# Patient Record
Sex: FEMALE | Race: BLACK OR AFRICAN AMERICAN | Employment: PART TIME | URBAN - METROPOLITAN AREA
[De-identification: names, ages, dates, MRNs, and addresses within clinical notes are randomized per-mention and may not be internally consistent; named-entity substitution may affect disease eponyms.]

---

## 2017-07-09 ENCOUNTER — HOSPITAL ENCOUNTER (EMERGENCY)
Facility: HOSPITAL | Age: 22
Discharge: HOME | End: 2017-07-09
Attending: EMERGENCY MEDICINE
Payer: COMMERCIAL

## 2017-07-09 VITALS
HEIGHT: 63 IN | SYSTOLIC BLOOD PRESSURE: 125 MMHG | TEMPERATURE: 98 F | OXYGEN SATURATION: 98 % | BODY MASS INDEX: 51.91 KG/M2 | HEART RATE: 99 BPM | DIASTOLIC BLOOD PRESSURE: 90 MMHG | RESPIRATION RATE: 14 BRPM | WEIGHT: 293 LBS

## 2017-07-09 DIAGNOSIS — H66.002 ACUTE SUPPURATIVE OTITIS MEDIA OF LEFT EAR WITHOUT SPONTANEOUS RUPTURE OF TYMPANIC MEMBRANE, RECURRENCE NOT SPECIFIED: Primary | ICD-10-CM

## 2017-07-09 PROCEDURE — 99283 EMERGENCY DEPT VISIT LOW MDM: CPT

## 2017-07-09 RX ORDER — AMOXICILLIN 500 MG/1
500 TABLET, FILM COATED ORAL 3 TIMES DAILY
Qty: 30 TABLET | Refills: 0 | Status: SHIPPED | OUTPATIENT
Start: 2017-07-09 | End: 2017-07-19

## 2017-07-09 NOTE — ED INITIAL ASSESSMENT (HPI)
Pt here with decreased hearing x 1wk. Last night could not sleep due to ticking in her ear. Left ear slightly painful. Went swimming yesterday.

## 2017-07-09 NOTE — ED PROVIDER NOTES
Patient Seen in: [facility] Emergency Department    History   Patient presents with:  Ear Problem Pain (neurosensory)    Stated Complaint: ear pain/sore throat    ALESSANDRO    Yesy Sharp is 64-year-old female coming with complaints of ear pain.   She started no home      Disposition and Plan     Clinical Impression:  Acute suppurative otitis media of left ear without spontaneous rupture of tympanic membrane, recurrence not specified  (primary encounter diagnosis)    Disposition:  Discharge    Follow-up:  No heleno

## 2019-07-07 ENCOUNTER — HOSPITAL ENCOUNTER (EMERGENCY)
Facility: HOSPITAL | Age: 24
Discharge: HOME | End: 2019-07-07
Attending: PEDIATRICS
Payer: COMMERCIAL

## 2019-07-07 VITALS
DIASTOLIC BLOOD PRESSURE: 85 MMHG | OXYGEN SATURATION: 99 % | HEIGHT: 63 IN | WEIGHT: 293 LBS | RESPIRATION RATE: 18 BRPM | SYSTOLIC BLOOD PRESSURE: 147 MMHG | HEART RATE: 91 BPM | TEMPERATURE: 97 F | BODY MASS INDEX: 51.91 KG/M2

## 2019-07-07 DIAGNOSIS — H91.92 HEARING DIFFICULTY OF LEFT EAR: Primary | ICD-10-CM

## 2019-07-07 PROCEDURE — 99282 EMERGENCY DEPT VISIT SF MDM: CPT

## 2019-07-07 NOTE — ED INITIAL ASSESSMENT (HPI)
C/o hearing change in L ear, reports she can hear but feels the pitch changes with volume increase. Denies pain or discharge. Has just recently returned from [facility] where she was at the [facility].

## 2019-07-07 NOTE — ED PROVIDER NOTES
Patient Seen in: [facility] Emergency Department    History   Patient presents with:  Ear Problem Pain (neurosensory)    Stated Complaint: hearing echos in L ear    HPI    Patient is a 78-year-old female complaining of a feeling of echoes in her lef perfused. Dermatologic exam: No rashes or lesions. Neurologic exam: Cranial nerves 2-12 grossly intact. Orthopedic exam: normal,from.     ED Course   Labs Reviewed - No data to display       Patient appears to have some inner ear fluid that does not ap

## 2019-07-22 ENCOUNTER — HOSPITAL ENCOUNTER (EMERGENCY)
Facility: HOSPITAL | Age: 24
Discharge: HOME | End: 2019-07-22
Attending: EMERGENCY MEDICINE
Payer: COMMERCIAL

## 2019-07-22 VITALS
HEIGHT: 63 IN | SYSTOLIC BLOOD PRESSURE: 146 MMHG | HEART RATE: 84 BPM | RESPIRATION RATE: 16 BRPM | DIASTOLIC BLOOD PRESSURE: 79 MMHG | WEIGHT: 293 LBS | OXYGEN SATURATION: 98 % | TEMPERATURE: 99 F | BODY MASS INDEX: 51.91 KG/M2

## 2019-07-22 DIAGNOSIS — T74.21XA SEXUAL ASSAULT OF ADULT, INITIAL ENCOUNTER: Primary | ICD-10-CM

## 2019-07-22 PROCEDURE — 87491 CHLMYD TRACH DNA AMP PROBE: CPT | Performed by: EMERGENCY MEDICINE

## 2019-07-22 PROCEDURE — 99285 EMERGENCY DEPT VISIT HI MDM: CPT

## 2019-07-22 PROCEDURE — 87591 N.GONORRHOEAE DNA AMP PROB: CPT | Performed by: EMERGENCY MEDICINE

## 2019-07-22 PROCEDURE — 99283 EMERGENCY DEPT VISIT LOW MDM: CPT

## 2019-07-22 PROCEDURE — 96372 THER/PROPH/DIAG INJ SC/IM: CPT

## 2019-07-22 RX ORDER — LEVONORGESTREL 1.5 MG/1
1.5 TABLET ORAL ONCE
Status: COMPLETED | OUTPATIENT
Start: 2019-07-22 | End: 2019-07-22

## 2019-07-22 RX ORDER — AZITHROMYCIN 250 MG/1
1000 TABLET, FILM COATED ORAL ONCE
Status: COMPLETED | OUTPATIENT
Start: 2019-07-22 | End: 2019-07-22

## 2019-07-22 NOTE — ED PROVIDER NOTES
Patient Seen in: [facility] Emergency Department    History   Patient presents with:  Eval-S (psychosocial)    Stated Complaint: SA    HPI    The patient is a 50-year-old female who presents emergency room with a history of present in the ER for exa appreciated anywhere throughout the abdomen. There is no guarding, no rebound, no mass, and no organomegaly appreciated. There is normoactive bowel sounds. There is no hernia. EXTREMITIES: There is no cyanosis, clubbing, or edema appreciated.  Pulses are 2

## 2019-07-23 LAB
C TRACH DNA SPEC QL NAA+PROBE: POSITIVE
N GONORRHOEA DNA SPEC QL NAA+PROBE: NEGATIVE

## 2019-07-23 NOTE — ED NOTES
Jackie Fried 22year old female [address] [place] [address] Drive #: [id number]  Chief Complaint: Patient presents with:  Eval-S (psychosocial)    Date of exam: 7/22/2019 Time of Exam:1710  ED Staff MD: Renato Jaramillo MD  ED RN: Raegan Escalante

## 2020-11-19 ENCOUNTER — ORDER TRANSCRIPTION (OUTPATIENT)
Dept: SLEEP CENTER | Age: 25
End: 2020-11-19

## 2020-11-19 DIAGNOSIS — G47.30 SLEEP APNEA: Primary | ICD-10-CM

## 2020-12-15 PROBLEM — Z51.81 MEDICATION MONITORING ENCOUNTER: Status: ACTIVE | Noted: 2020-12-15

## 2021-01-13 ENCOUNTER — APPOINTMENT (OUTPATIENT)
Dept: GENERAL RADIOLOGY | Facility: HOSPITAL | Age: 26
End: 2021-01-13
Attending: EMERGENCY MEDICINE
Payer: MEDICAID

## 2021-01-13 ENCOUNTER — HOSPITAL ENCOUNTER (EMERGENCY)
Facility: HOSPITAL | Age: 26
Discharge: HOME | End: 2021-01-13
Attending: EMERGENCY MEDICINE
Payer: MEDICAID

## 2021-01-13 VITALS
TEMPERATURE: 99 F | DIASTOLIC BLOOD PRESSURE: 77 MMHG | HEIGHT: 63 IN | OXYGEN SATURATION: 100 % | BODY MASS INDEX: 51.91 KG/M2 | RESPIRATION RATE: 16 BRPM | HEART RATE: 102 BPM | WEIGHT: 293 LBS | SYSTOLIC BLOOD PRESSURE: 147 MMHG

## 2021-01-13 DIAGNOSIS — S92.902A CLOSED FRACTURE OF LEFT FOOT, INITIAL ENCOUNTER: Primary | ICD-10-CM

## 2021-01-13 PROCEDURE — 73610 X-RAY EXAM OF ANKLE: CPT | Performed by: EMERGENCY MEDICINE

## 2021-01-13 PROCEDURE — 73630 X-RAY EXAM OF FOOT: CPT | Performed by: EMERGENCY MEDICINE

## 2021-01-13 PROCEDURE — 99284 EMERGENCY DEPT VISIT MOD MDM: CPT

## 2021-01-13 PROCEDURE — 73590 X-RAY EXAM OF LOWER LEG: CPT | Performed by: EMERGENCY MEDICINE

## 2021-01-13 PROCEDURE — 29515 APPLICATION SHORT LEG SPLINT: CPT

## 2021-01-13 PROCEDURE — 99283 EMERGENCY DEPT VISIT LOW MDM: CPT

## 2021-01-13 NOTE — ED PROVIDER NOTES
Patient Seen in: [facility] Emergency Department      History   Patient presents with:  Leg or Foot Injury    Stated Complaint: Twisted ankle while walking, slipped on ice.      HPI/Subjective:   HPI    67-year-old female presents emergency room for Scalp is atraumatic. NECK: No midline cervical thoracic or lumbar spine tenderness step-off   HEART: Regular rate and rhythm, no murmurs. LUNGS: Clear to auscultation bilaterally. No Rales, no rhonchi, no wheezing, no stridor.   EXTREMITIES: No tendernes

## 2021-01-13 NOTE — ED NOTES
Report received from [facility], RN called to XR to inform of new imaging added on by MD. Pt ready for XR now

## 2021-07-20 ENCOUNTER — HOSPITAL ENCOUNTER (EMERGENCY)
Facility: HOSPITAL | Age: 26
Discharge: HOME | End: 2021-07-20
Attending: EMERGENCY MEDICINE
Payer: OTHER MISCELLANEOUS

## 2021-07-20 VITALS
TEMPERATURE: 98 F | OXYGEN SATURATION: 98 % | HEIGHT: 63 IN | WEIGHT: 293 LBS | HEART RATE: 88 BPM | RESPIRATION RATE: 18 BRPM | BODY MASS INDEX: 51.91 KG/M2 | SYSTOLIC BLOOD PRESSURE: 144 MMHG | DIASTOLIC BLOOD PRESSURE: 78 MMHG

## 2021-07-20 DIAGNOSIS — T26.92XA CHEMICAL BURN OF LEFT EYE: Primary | ICD-10-CM

## 2021-07-20 PROCEDURE — 99283 EMERGENCY DEPT VISIT LOW MDM: CPT

## 2021-07-20 RX ORDER — TETRACAINE HYDROCHLORIDE 5 MG/ML
2 SOLUTION OPHTHALMIC ONCE
Status: COMPLETED | OUTPATIENT
Start: 2021-07-20 | End: 2021-07-20

## 2021-07-20 RX ORDER — ERYTHROMYCIN 5 MG/G
1 OINTMENT OPHTHALMIC EVERY 6 HOURS
Qty: 1 G | Refills: 0 | Status: SHIPPED | OUTPATIENT
Start: 2021-07-20 | End: 2021-07-27

## 2021-07-20 NOTE — ED PROVIDER NOTES
Patient Seen in: [facility] Emergency Department      History   Patient presents with:   Eye Visual Problem    Stated Complaint: grease to L eye from work    HPI/Subjective:   HPI    This is a very pleasant 21-year-old female that presents for evalua 20/40 right eye  20/25 left eye. ED Course   Labs Reviewed - No data to display                MDM          Patient can follow-up with ophthalmology. Tetanus immunization up-to-date. Will prescribe erythromycin ointment.   Discussed wearing eye p

## 2021-07-20 NOTE — ED INITIAL ASSESSMENT (HPI)
Pt was working near [employer] at [facility] and the [facility], and went in her eye. Flushed eye for 5 min.

## 2021-12-19 ENCOUNTER — LAB SERVICES (OUTPATIENT)
Dept: URGENT CARE | Age: 26
End: 2021-12-19

## 2021-12-19 ENCOUNTER — TELEPHONE (OUTPATIENT)
Dept: SCHEDULING | Age: 26
End: 2021-12-19

## 2021-12-19 DIAGNOSIS — Z01.812 ENCOUNTER FOR SCREENING LABORATORY TESTING FOR COVID-19 VIRUS IN ASYMPTOMATIC PATIENT: Primary | ICD-10-CM

## 2021-12-19 DIAGNOSIS — Z11.52 ENCOUNTER FOR SCREENING LABORATORY TESTING FOR COVID-19 VIRUS IN ASYMPTOMATIC PATIENT: Primary | ICD-10-CM

## 2021-12-19 PROCEDURE — U0003 INFECTIOUS AGENT DETECTION BY NUCLEIC ACID (DNA OR RNA); SEVERE ACUTE RESPIRATORY SYNDROME CORONAVIRUS 2 (SARS-COV-2) (CORONAVIRUS DISEASE [COVID-19]), AMPLIFIED PROBE TECHNIQUE, MAKING USE OF HIGH THROUGHPUT TECHNOLOGIES AS DESCRIBED BY CMS-2020-01-R: HCPCS | Performed by: PSYCHIATRY & NEUROLOGY

## 2021-12-19 PROCEDURE — U0005 INFEC AGEN DETEC AMPLI PROBE: HCPCS | Performed by: PSYCHIATRY & NEUROLOGY

## 2021-12-20 LAB
SARS-COV-2 RNA RESP QL NAA+PROBE: NOT DETECTED
SERVICE CMNT-IMP: NORMAL
SERVICE CMNT-IMP: NORMAL

## 2022-04-11 ENCOUNTER — ORDER TRANSCRIPTION (OUTPATIENT)
Dept: SLEEP CENTER | Age: 27
End: 2022-04-11

## 2022-04-25 ENCOUNTER — LAB ENCOUNTER (OUTPATIENT)
Dept: LAB | Facility: HOSPITAL | Age: 27
End: 2022-04-25
Attending: FAMILY MEDICINE
Payer: MEDICAID

## 2022-04-25 DIAGNOSIS — Z01.818 PREOP EXAMINATION: ICD-10-CM

## 2022-04-25 DIAGNOSIS — Z11.59 SCREENING FOR VIRAL DISEASE: ICD-10-CM

## 2022-04-25 LAB — SARS-COV-2 RNA RESP QL NAA+PROBE: NOT DETECTED

## 2022-04-28 ENCOUNTER — OFFICE VISIT (OUTPATIENT)
Dept: SLEEP CENTER | Age: 27
End: 2022-04-28
Attending: INTERNAL MEDICINE
Payer: MEDICAID

## 2022-04-28 DIAGNOSIS — G47.30 SLEEP APNEA, UNSPECIFIED TYPE: ICD-10-CM

## 2022-04-28 PROCEDURE — 95811 POLYSOM 6/>YRS CPAP 4/> PARM: CPT

## 2022-04-28 PROCEDURE — 95810 POLYSOM 6/> YRS 4/> PARAM: CPT

## 2024-03-13 PROCEDURE — 99282 EMERGENCY DEPT VISIT SF MDM: CPT

## 2024-03-13 PROCEDURE — 99283 EMERGENCY DEPT VISIT LOW MDM: CPT

## 2024-03-14 ENCOUNTER — HOSPITAL ENCOUNTER (EMERGENCY)
Facility: HOSPITAL | Age: 29
Discharge: HOME | End: 2024-03-14
Attending: EMERGENCY MEDICINE

## 2024-03-14 VITALS
OXYGEN SATURATION: 98 % | WEIGHT: 293 LBS | HEART RATE: 85 BPM | TEMPERATURE: 97 F | DIASTOLIC BLOOD PRESSURE: 65 MMHG | BODY MASS INDEX: 65 KG/M2 | RESPIRATION RATE: 20 BRPM | SYSTOLIC BLOOD PRESSURE: 140 MMHG

## 2024-03-14 DIAGNOSIS — T21.22XA PARTIAL THICKNESS BURN OF ABDOMINAL WALL, INITIAL ENCOUNTER: Primary | ICD-10-CM

## 2024-03-14 NOTE — ED PROVIDER NOTES
Patient Seen in: [facility] Emergency Department      History     Chief Complaint   Patient presents with    Burn     Stated Complaint: burn from cooking to stomach    Subjective:   HPI    28-year-old female presenting emergency room for burn.  Patient is currently 3:00 in the morning 24 hours ago suffered a burn to her stomach she says she been doing dressing changes and using triple antibiotic ointment on it but she just wanted to get checked out so she now presents emergency department denies any other complaints.    Objective:   Past Medical History:   Diagnosis Date    Hypothyroid     Prediabetes               History reviewed. No pertinent surgical history.             Social History     Socioeconomic History    Marital status: Single   Occupational History    Occupation: student   Tobacco Use    Smoking status: Never    Smokeless tobacco: Never   Vaping Use    Vaping Use: Never used   Substance and Sexual Activity    Alcohol use: Yes    Drug use: Never              Review of Systems    Positive for stated complaint: burn from cooking to stomach  Other systems are as noted in HPI.  Constitutional and vital signs reviewed.      All other systems reviewed and negative except as noted above.    Physical Exam     ED Triage Vitals [03/14/24 0016]   /85   Pulse 86   Resp 20   Temp 96.9 °F (36.1 °C)   Temp src Temporal   SpO2 99 %   O2 Device None (Room air)       Current:/85   Pulse 86   Temp 96.9 °F (36.1 °C) (Temporal)   Resp 20   Wt (!) 165.6 kg   SpO2 99%   BMI 65.49 kg/m²         Physical Exam    Awake alert patient appears no distress HEENT exam normal lungs normal cardiovascular exam normal abdomen abdominal wall small partial-thickness burn left mid abdominal wall no surrounding erythema no blistering of skin no partials no full-thickness burn noted.  Extremity exam is normal no focal neurologic deficits    ED Course   Labs Reviewed - No data to display          Differential  diagnosis includes full-thickness burn partial-thickness burn         MDM                                         Medical Decision Making  28-year-old female presenting with a partial-thickness burn to the abdominal wall due to a thermal injury.  Patient exam does not appear to have any sort of cellulitic area is a small area of burn he was given burn instructions as well as wound care she was referred to the [place] [facility] clinic for follow-up she is to return emerged part worsening symptoms other complaints the patient was screened and evaluated during this visit.  As a treating physician attending to the patient, I determined, within reasonable clinical confidence and prior to discharge, that an emergency medical condition was not or was no longer present.  There was no indication for further evaluation, treatment or admission on an emergency basis.    The usual and customary discharge instructions were discussed given the patient's ER course.  We discussed signs and symptoms that should prompt the patient's immediate return to the emergency department.  Reasonable over-the-counter and prescription treatment options and physician follow-up plan was discussed.  Patient was discharged home in good condition    This note was prepared using Dragon Medical voice recognition dictation software.  As a result errors may occur.  When identified to these areas have been corrected.  While every attempt is made to correct errors during dictation discrepancies may still exist.  Please contact if there are any errors    Problems Addressed:  Partial thickness burn of abdominal wall, initial encounter: acute illness or injury    Amount and/or Complexity of Data Reviewed  ECG/medicine tests: ordered and independent interpretation performed. Decision-making details documented in ED Course.        Disposition and Plan     Clinical Impression:  1. Partial thickness burn of abdominal wall, initial encounter          Disposition:  Discharge  3/14/2024  3:33 am    Follow-up:  [place]  [address] [facility] [address]  [phone]    Follow up in 1 week(s)            Medications Prescribed:  Current Discharge Medication List

## 2024-10-01 ENCOUNTER — HOSPITAL ENCOUNTER (EMERGENCY)
Facility: HOSPITAL | Age: 29
Discharge: HOME | End: 2024-10-01
Attending: STUDENT IN AN ORGANIZED HEALTH CARE EDUCATION/TRAINING PROGRAM
Payer: MEDICAID

## 2024-10-01 VITALS
SYSTOLIC BLOOD PRESSURE: 120 MMHG | DIASTOLIC BLOOD PRESSURE: 73 MMHG | TEMPERATURE: 98 F | BODY MASS INDEX: 51.91 KG/M2 | OXYGEN SATURATION: 100 % | HEART RATE: 98 BPM | RESPIRATION RATE: 16 BRPM | WEIGHT: 293 LBS | HEIGHT: 63 IN

## 2024-10-01 DIAGNOSIS — J01.90 ACUTE SINUSITIS, RECURRENCE NOT SPECIFIED, UNSPECIFIED LOCATION: Primary | ICD-10-CM

## 2024-10-01 LAB
FLUAV + FLUBV RNA SPEC NAA+PROBE: NEGATIVE
FLUAV + FLUBV RNA SPEC NAA+PROBE: NEGATIVE
RSV RNA SPEC NAA+PROBE: NEGATIVE
SARS-COV-2 RNA RESP QL NAA+PROBE: NOT DETECTED

## 2024-10-01 PROCEDURE — 87430 STREP A AG IA: CPT | Performed by: STUDENT IN AN ORGANIZED HEALTH CARE EDUCATION/TRAINING PROGRAM

## 2024-10-01 PROCEDURE — 99283 EMERGENCY DEPT VISIT LOW MDM: CPT

## 2024-10-01 PROCEDURE — 0241U SARS-COV-2/FLU A AND B/RSV BY PCR (GENEXPERT): CPT | Performed by: STUDENT IN AN ORGANIZED HEALTH CARE EDUCATION/TRAINING PROGRAM

## 2024-10-01 RX ORDER — FLUTICASONE PROPIONATE 50 MCG
2 SPRAY, SUSPENSION (ML) NASAL DAILY
Qty: 16 G | Refills: 0 | Status: SHIPPED | OUTPATIENT
Start: 2024-10-01 | End: 2024-10-31

## 2024-10-01 RX ORDER — PSEUDOEPHEDRINE HCL 30 MG
30 TABLET ORAL EVERY 4 HOURS PRN
Qty: 12 TABLET | Refills: 0 | Status: SHIPPED | OUTPATIENT
Start: 2024-10-01 | End: 2024-10-04

## 2024-10-01 NOTE — ED PROVIDER NOTES
History     Chief Complaint   Patient presents with    Sore Throat    Ear Problem Pain     Started after coming back from [place] on 9/24     Vomiting       HPI    29 year old female here with 4-5d of congestion, cough, sore throat, fatigue, myalgias, at onset patient had vomiting and diarrhea which has started to improve.  She feels pressure along bilateral ears.  She had leftover amoxicillin tablets from an old infection and she took yesterday and today.          Past Medical History:    Hypothyroid    Prediabetes       History reviewed. No pertinent surgical history.    Social History     Socioeconomic History    Marital status: Single   Occupational History    Occupation: student   Tobacco Use    Smoking status: Never    Smokeless tobacco: Never   Vaping Use    Vaping status: Never Used   Substance and Sexual Activity    Alcohol use: Yes    Drug use: Never                   Physical Exam     ED Triage Vitals [10/01/24 0955]   /72   Pulse 103   Resp 18   Temp 98 °F (36.7 °C)   Temp src Temporal   SpO2 98 %   O2 Device None (Room air)       Physical Exam  Constitutional:       General: She is not in acute distress.  HENT:      Ears:      Comments: Effusion along bilateral TM with no erythema or bulging     Nose: Congestion and rhinorrhea present.      Mouth/Throat:      Pharynx: Posterior oropharyngeal erythema present. No oropharyngeal exudate.      Comments: Uvula midline, no trismus or drooling, there is bilateral tonsillar swelling without exudates  Eyes:      Extraocular Movements: Extraocular movements intact.   Cardiovascular:      Rate and Rhythm: Normal rate and regular rhythm.      Pulses: Normal pulses.      Heart sounds: Normal heart sounds.   Pulmonary:      Effort: Pulmonary effort is normal. No respiratory distress.      Breath sounds: Normal breath sounds.   Abdominal:      General: Abdomen is flat. There is no distension.      Tenderness: There is no abdominal tenderness.    Musculoskeletal:      Cervical back: Normal range of motion.   Neurological:      General: No focal deficit present.      Mental Status: She is alert.              ED Course     Labs Reviewed   RAPID STREP A SCREEN (LC) - Normal    Narrative:     A confirmatory culture is recommended if clinically indicated.   SARS-COV-2/FLU A AND B/RSV BY PCR (GENEXPERT) - Normal    Narrative:     This test is intended for the qualitative detection and differentiation of SARS-CoV-2, influenza A, influenza B, and respiratory syncytial virus (RSV) viral RNA in nasopharyngeal or nares swabs from individuals suspected of respiratory viral infection consistent with COVID-19 by their healthcare provider. Signs and symptoms of respiratory viral infection due to SARS-CoV-2, influenza, and RSV can be similar.    Test performed using the Xpert Xpress SARS-CoV-2/FLU/RSV (real time RT-PCR)  assay on the [employer] instrument, [employer], [place] [address].   This test is being used under the Food and Drug Administration's Emergency Use Authorization.    The authorized Fact Sheet for Healthcare Providers for this assay is available upon request from the laboratory.     No results found.        MDM     Vitals:    10/01/24 0955 10/01/24 1144   BP: 136/72 120/73   Pulse: 103 98   Resp: 18 16   Temp: 98 °F (36.7 °C)    TempSrc: Temporal    SpO2: 98% 100%   Weight: (!) 167.8 kg    Height: 160 cm (5' 3\")        Patient very likely has viral upper respiratory infection with viral syndrome.  She does have development of appears to be sinusitis, clinical picture is moderate since she has taken antibiotics for the past 48 hours.  Will swab for strep although may be falsely negative.  Will check viruses.    ED Course as of 10/01/24 1605  ------------------------------------------------------------  Time: 10/01 1130  Comment: Swabs here are negative.  Will give patient a 5-day course of Augmentin to finish out her 7 days for sinusitis treatment.  In  addition [place] pot and Flonase, decongestant.  Discussed all findings.  Patient is feeling well to be discharged.  Vitals remain stable.  Ambulating without difficulty.  Given written and verbal instructions regarding this condition and strict return precautions.   Will follow up in clinic.   Patient verbalizes understanding of instructions and follow up plan, as well as indications to return to the emergency department.           Disposition and Plan     Clinical Impression:  1. Acute sinusitis, recurrence not specified, unspecified location        Disposition:  Discharge    Follow-up:  Magali Polo MD  [facility] [place] [address]  [phone]    Follow up        Medications Prescribed:  Discharge Medication List as of 10/1/2024 11:44 AM        START taking these medications    Details   amoxicillin clavulanate 875-125 MG Oral Tab Take 1 tablet by mouth 2 (two) times daily for 5 days., Normal, Disp-10 tablet, R-0      fluticasone propionate 50 MCG/ACT Nasal Suspension 2 sprays by Nasal route daily., Normal, Disp-16 g, R-0      pseudoephedrine 30 MG Oral Tab Take 1 tablet (30 mg total) by mouth every 4 (four) hours as needed for congestion., Normal, Disp-12 tablet, R-0

## 2024-10-01 NOTE — ED INITIAL ASSESSMENT (HPI)
Pt to ER ambulatory with various complaints, arrived from [place] 5 days ago emesis started Friday ([id number]), intermittent diarrhea, fever 100F and chills. Bilateral neck pain, worse to the left side. States \"tonsils are sticking together and I can't swallow\". Cough, congestion and sneezing for 3 days. For 2 days has had bilateral eye itching with mucus. Pt also adds that \"I cannot hear. There is pressure and it feels like there are cotton balls in my ears\"

## 2024-10-01 NOTE — ED QUICK NOTES
Rounding Completed    Plan of Care reviewed. Waiting for test results.  Elimination needs assessed.    Bed is locked and in lowest position. Call light within reach.

## 2025-02-08 ENCOUNTER — ANESTHESIA EVENT (OUTPATIENT)
Dept: OBGYN UNIT | Facility: HOSPITAL | Age: 30
End: 2025-02-08

## 2025-02-08 ENCOUNTER — HOSPITAL ENCOUNTER (INPATIENT)
Facility: HOSPITAL | Age: 30
LOS: 2 days | Discharge: HOME | End: 2025-02-10
Attending: OBSTETRICS & GYNECOLOGY | Admitting: OBSTETRICS & GYNECOLOGY
Payer: MEDICAID

## 2025-02-08 ENCOUNTER — ANESTHESIA (OUTPATIENT)
Dept: OBGYN UNIT | Facility: HOSPITAL | Age: 30
End: 2025-02-08

## 2025-02-08 ENCOUNTER — APPOINTMENT (OUTPATIENT)
Dept: ULTRASOUND IMAGING | Facility: HOSPITAL | Age: 30
End: 2025-02-08
Attending: EMERGENCY MEDICINE
Payer: MEDICAID

## 2025-02-08 ENCOUNTER — HOSPITAL ENCOUNTER (INPATIENT)
Facility: HOSPITAL | Age: 30
LOS: 1 days | Discharge: HOME | End: 2025-02-08
Attending: EMERGENCY MEDICINE | Admitting: OBSTETRICS & GYNECOLOGY
Payer: MEDICAID

## 2025-02-08 ENCOUNTER — APPOINTMENT (OUTPATIENT)
Dept: CT IMAGING | Facility: HOSPITAL | Age: 30
End: 2025-02-08
Attending: EMERGENCY MEDICINE
Payer: MEDICAID

## 2025-02-08 VITALS
BODY MASS INDEX: 51.91 KG/M2 | OXYGEN SATURATION: 100 % | HEART RATE: 94 BPM | WEIGHT: 293 LBS | HEIGHT: 63 IN | TEMPERATURE: 98 F | RESPIRATION RATE: 18 BRPM | DIASTOLIC BLOOD PRESSURE: 69 MMHG | SYSTOLIC BLOOD PRESSURE: 134 MMHG

## 2025-02-08 DIAGNOSIS — O60.00: Primary | ICD-10-CM

## 2025-02-08 PROBLEM — Z34.90 PREGNANCY (HCC): Status: ACTIVE | Noted: 2025-02-08

## 2025-02-08 PROBLEM — O09.33 NO PRENATAL CARE IN CURRENT PREGNANCY IN THIRD TRIMESTER (HCC): Status: ACTIVE | Noted: 2025-02-08

## 2025-02-08 LAB
ALBUMIN SERPL-MCNC: 3.6 G/DL (ref 3.2–4.8)
ALBUMIN/GLOB SERPL: 1 {RATIO} (ref 1–2)
ALP LIVER SERPL-CCNC: 205 U/L
ALT SERPL-CCNC: <7 U/L
ANION GAP SERPL CALC-SCNC: 11 MMOL/L (ref 0–18)
ANTIBODY SCREEN: NEGATIVE
AST SERPL-CCNC: 12 U/L (ref ?–34)
B-HCG SERPL-ACNC: 4376 MIU/ML
B-HCG UR QL: POSITIVE
BASOPHILS # BLD AUTO: 0.02 X10(3) UL (ref 0–0.2)
BASOPHILS NFR BLD AUTO: 0.2 %
BILIRUB SERPL-MCNC: 0.6 MG/DL (ref 0.3–1.2)
BUN BLD-MCNC: <5 MG/DL (ref 9–23)
CALCIUM BLD-MCNC: 8.8 MG/DL (ref 8.7–10.6)
CHLORIDE SERPL-SCNC: 106 MMOL/L (ref 98–112)
CO2 SERPL-SCNC: 19 MMOL/L (ref 21–32)
CREAT BLD-MCNC: 0.67 MG/DL
EGFRCR SERPLBLD CKD-EPI 2021: 122 ML/MIN/1.73M2 (ref 60–?)
EOSINOPHIL # BLD AUTO: 0.02 X10(3) UL (ref 0–0.7)
EOSINOPHIL NFR BLD AUTO: 0.2 %
ERYTHROCYTE [DISTWIDTH] IN BLOOD BY AUTOMATED COUNT: 14.8 %
EST. AVERAGE GLUCOSE BLD GHB EST-MCNC: 120 MG/DL (ref 68–126)
GLOBULIN PLAS-MCNC: 3.5 G/DL (ref 2–3.5)
GLUCOSE BLD-MCNC: 114 MG/DL (ref 70–99)
HBA1C MFR BLD: 5.8 % (ref ?–5.7)
HBV SURFACE AG SER-ACNC: 0.1 [IU]/L
HBV SURFACE AG SERPL QL IA: NONREACTIVE
HCG SERPL QL: POSITIVE
HCT VFR BLD AUTO: 33.2 %
HGB BLD-MCNC: 11.4 G/DL
HIV 1+2 AB+HIV1 P24 AG SERPL QL IA: NONREACTIVE
IMM GRANULOCYTES # BLD AUTO: 0.03 X10(3) UL (ref 0–1)
IMM GRANULOCYTES NFR BLD: 0.3 %
LIPASE SERPL-CCNC: 23 U/L (ref 12–53)
LYMPHOCYTES # BLD AUTO: 1.52 X10(3) UL (ref 1–4)
LYMPHOCYTES NFR BLD AUTO: 13.8 %
MCH RBC QN AUTO: 26.8 PG (ref 26–34)
MCHC RBC AUTO-ENTMCNC: 34.3 G/DL (ref 31–37)
MCV RBC AUTO: 78.1 FL
MONOCYTES # BLD AUTO: 0.79 X10(3) UL (ref 0.1–1)
MONOCYTES NFR BLD AUTO: 7.2 %
NEUTROPHILS # BLD AUTO: 8.62 X10 (3) UL (ref 1.5–7.7)
NEUTROPHILS # BLD AUTO: 8.62 X10(3) UL (ref 1.5–7.7)
NEUTROPHILS NFR BLD AUTO: 78.3 %
PLATELET # BLD AUTO: 328 10(3)UL (ref 150–450)
POTASSIUM SERPL-SCNC: 3.5 MMOL/L (ref 3.5–5.1)
PROT SERPL-MCNC: 7.1 G/DL (ref 5.7–8.2)
RBC # BLD AUTO: 4.25 X10(6)UL
RH BLOOD TYPE: POSITIVE
RH BLOOD TYPE: POSITIVE
RUBV IGG SER QL: POSITIVE
RUBV IGG SER-ACNC: 19 IU/ML (ref 10–?)
SODIUM SERPL-SCNC: 136 MMOL/L (ref 136–145)
T PALLIDUM AB SER QL IA: NONREACTIVE
WBC # BLD AUTO: 11 X10(3) UL (ref 4–11)

## 2025-02-08 PROCEDURE — 86901 BLOOD TYPING SEROLOGIC RH(D): CPT | Performed by: EMERGENCY MEDICINE

## 2025-02-08 PROCEDURE — 80053 COMPREHEN METABOLIC PANEL: CPT

## 2025-02-08 PROCEDURE — 83690 ASSAY OF LIPASE: CPT | Performed by: EMERGENCY MEDICINE

## 2025-02-08 PROCEDURE — 86900 BLOOD TYPING SEROLOGIC ABO: CPT | Performed by: EMERGENCY MEDICINE

## 2025-02-08 PROCEDURE — 86901 BLOOD TYPING SEROLOGIC RH(D): CPT | Performed by: OBSTETRICS & GYNECOLOGY

## 2025-02-08 PROCEDURE — 84703 CHORIONIC GONADOTROPIN ASSAY: CPT | Performed by: EMERGENCY MEDICINE

## 2025-02-08 PROCEDURE — 87340 HEPATITIS B SURFACE AG IA: CPT | Performed by: OBSTETRICS & GYNECOLOGY

## 2025-02-08 PROCEDURE — 86780 TREPONEMA PALLIDUM: CPT | Performed by: OBSTETRICS & GYNECOLOGY

## 2025-02-08 PROCEDURE — 81025 URINE PREGNANCY TEST: CPT

## 2025-02-08 PROCEDURE — 86762 RUBELLA ANTIBODY: CPT | Performed by: OBSTETRICS & GYNECOLOGY

## 2025-02-08 PROCEDURE — 85025 COMPLETE CBC W/AUTO DIFF WBC: CPT

## 2025-02-08 PROCEDURE — 85025 COMPLETE CBC W/AUTO DIFF WBC: CPT | Performed by: EMERGENCY MEDICINE

## 2025-02-08 PROCEDURE — 0UQGXZZ REPAIR VAGINA, EXTERNAL APPROACH: ICD-10-PCS | Performed by: OBSTETRICS & GYNECOLOGY

## 2025-02-08 PROCEDURE — 86850 RBC ANTIBODY SCREEN: CPT | Performed by: OBSTETRICS & GYNECOLOGY

## 2025-02-08 PROCEDURE — 84702 CHORIONIC GONADOTROPIN TEST: CPT | Performed by: EMERGENCY MEDICINE

## 2025-02-08 PROCEDURE — 76815 OB US LIMITED FETUS(S): CPT | Performed by: EMERGENCY MEDICINE

## 2025-02-08 PROCEDURE — 86701 HIV-1ANTIBODY: CPT | Performed by: OBSTETRICS & GYNECOLOGY

## 2025-02-08 PROCEDURE — 86900 BLOOD TYPING SEROLOGIC ABO: CPT | Performed by: OBSTETRICS & GYNECOLOGY

## 2025-02-08 PROCEDURE — 80053 COMPREHEN METABOLIC PANEL: CPT | Performed by: EMERGENCY MEDICINE

## 2025-02-08 PROCEDURE — 59409 OBSTETRICAL CARE: CPT | Performed by: OBSTETRICS & GYNECOLOGY

## 2025-02-08 RX ORDER — SIMETHICONE 80 MG
80 TABLET,CHEWABLE ORAL 3 TIMES DAILY PRN
Status: DISCONTINUED | OUTPATIENT
Start: 2025-02-08 | End: 2025-02-10

## 2025-02-08 RX ORDER — LIDOCAINE HYDROCHLORIDE 20 MG/ML
5 INJECTION, SOLUTION EPIDURAL; INFILTRATION; INTRACAUDAL; PERINEURAL AS NEEDED
Status: DISCONTINUED | OUTPATIENT
Start: 2025-02-08 | End: 2025-02-08

## 2025-02-08 RX ORDER — LIDOCAINE HCL/EPINEPHRINE/PF 2%-1:200K
VIAL (ML) INJECTION AS NEEDED
Status: DISCONTINUED | OUTPATIENT
Start: 2025-02-08 | End: 2025-02-08 | Stop reason: SURG

## 2025-02-08 RX ORDER — DEXTROSE, SODIUM CHLORIDE, SODIUM LACTATE, POTASSIUM CHLORIDE, AND CALCIUM CHLORIDE 5; .6; .31; .03; .02 G/100ML; G/100ML; G/100ML; G/100ML; G/100ML
INJECTION, SOLUTION INTRAVENOUS AS NEEDED
Status: DISCONTINUED | OUTPATIENT
Start: 2025-02-08 | End: 2025-02-08 | Stop reason: HOSPADM

## 2025-02-08 RX ORDER — LIDOCAINE HYDROCHLORIDE AND EPINEPHRINE 15; 5 MG/ML; UG/ML
INJECTION, SOLUTION EPIDURAL AS NEEDED
Status: DISCONTINUED | OUTPATIENT
Start: 2025-02-08 | End: 2025-02-08 | Stop reason: SURG

## 2025-02-08 RX ORDER — CALCIUM GLUCONATE 94 MG/ML
1 INJECTION, SOLUTION INTRAVENOUS ONCE AS NEEDED
Status: DISCONTINUED | OUTPATIENT
Start: 2025-02-08 | End: 2025-02-08

## 2025-02-08 RX ORDER — CARBOPROST TROMETHAMINE 250 UG/ML
INJECTION, SOLUTION INTRAMUSCULAR
Status: COMPLETED
Start: 2025-02-08 | End: 2025-02-08

## 2025-02-08 RX ORDER — DOCUSATE SODIUM 100 MG/1
100 CAPSULE, LIQUID FILLED ORAL
Status: DISCONTINUED | OUTPATIENT
Start: 2025-02-08 | End: 2025-02-10

## 2025-02-08 RX ORDER — AMMONIA 15 % (W/V)
0.3 AMPUL (EA) INHALATION AS NEEDED
Status: DISCONTINUED | OUTPATIENT
Start: 2025-02-08 | End: 2025-02-10

## 2025-02-08 RX ORDER — ONDANSETRON 2 MG/ML
4 INJECTION INTRAMUSCULAR; INTRAVENOUS EVERY 6 HOURS PRN
Status: DISCONTINUED | OUTPATIENT
Start: 2025-02-08 | End: 2025-02-08 | Stop reason: HOSPADM

## 2025-02-08 RX ORDER — ACETAMINOPHEN 500 MG
1000 TABLET ORAL EVERY 6 HOURS PRN
Status: DISCONTINUED | OUTPATIENT
Start: 2025-02-08 | End: 2025-02-08

## 2025-02-08 RX ORDER — SODIUM CHLORIDE, SODIUM LACTATE, POTASSIUM CHLORIDE, CALCIUM CHLORIDE 600; 310; 30; 20 MG/100ML; MG/100ML; MG/100ML; MG/100ML
INJECTION, SOLUTION INTRAVENOUS CONTINUOUS
Status: DISCONTINUED | OUTPATIENT
Start: 2025-02-08 | End: 2025-02-08

## 2025-02-08 RX ORDER — TERBUTALINE SULFATE 1 MG/ML
0.25 INJECTION SUBCUTANEOUS AS NEEDED
Status: DISCONTINUED | OUTPATIENT
Start: 2025-02-08 | End: 2025-02-08 | Stop reason: HOSPADM

## 2025-02-08 RX ORDER — KETOROLAC TROMETHAMINE 15 MG/ML
15 INJECTION, SOLUTION INTRAMUSCULAR; INTRAVENOUS ONCE
Status: DISCONTINUED | OUTPATIENT
Start: 2025-02-08 | End: 2025-02-08

## 2025-02-08 RX ORDER — BUPIVACAINE HCL/0.9 % NACL/PF 0.25 %
5 PLASTIC BAG, INJECTION (ML) EPIDURAL AS NEEDED
Status: DISCONTINUED | OUTPATIENT
Start: 2025-02-08 | End: 2025-02-08

## 2025-02-08 RX ORDER — SODIUM CHLORIDE, SODIUM LACTATE, POTASSIUM CHLORIDE, CALCIUM CHLORIDE 600; 310; 30; 20 MG/100ML; MG/100ML; MG/100ML; MG/100ML
INJECTION, SOLUTION INTRAVENOUS CONTINUOUS
Status: DISCONTINUED | OUTPATIENT
Start: 2025-02-08 | End: 2025-02-08 | Stop reason: HOSPADM

## 2025-02-08 RX ORDER — ACETAMINOPHEN 500 MG
1000 TABLET ORAL EVERY 6 HOURS PRN
Status: DISCONTINUED | OUTPATIENT
Start: 2025-02-08 | End: 2025-02-10

## 2025-02-08 RX ORDER — ONDANSETRON 2 MG/ML
4 INJECTION INTRAMUSCULAR; INTRAVENOUS EVERY 6 HOURS PRN
Status: DISCONTINUED | OUTPATIENT
Start: 2025-02-08 | End: 2025-02-08

## 2025-02-08 RX ORDER — CITRIC ACID/SODIUM CITRATE 334-500MG
30 SOLUTION, ORAL ORAL AS NEEDED
Status: DISCONTINUED | OUTPATIENT
Start: 2025-02-08 | End: 2025-02-08

## 2025-02-08 RX ORDER — IBUPROFEN 600 MG/1
600 TABLET, FILM COATED ORAL EVERY 6 HOURS
Status: DISCONTINUED | OUTPATIENT
Start: 2025-02-08 | End: 2025-02-10

## 2025-02-08 RX ORDER — ACETAMINOPHEN 500 MG
500 TABLET ORAL EVERY 6 HOURS PRN
Status: DISCONTINUED | OUTPATIENT
Start: 2025-02-08 | End: 2025-02-10

## 2025-02-08 RX ORDER — BETAMETHASONE SODIUM PHOSPHATE AND BETAMETHASONE ACETATE 3; 3 MG/ML; MG/ML
12 INJECTION, SUSPENSION INTRA-ARTICULAR; INTRALESIONAL; INTRAMUSCULAR; SOFT TISSUE EVERY 24 HOURS
Status: DISCONTINUED | OUTPATIENT
Start: 2025-02-08 | End: 2025-02-08

## 2025-02-08 RX ORDER — CITRIC ACID/SODIUM CITRATE 334-500MG
30 SOLUTION, ORAL ORAL AS NEEDED
Status: DISCONTINUED | OUTPATIENT
Start: 2025-02-08 | End: 2025-02-08 | Stop reason: HOSPADM

## 2025-02-08 RX ORDER — BUPIVACAINE HYDROCHLORIDE 2.5 MG/ML
INJECTION, SOLUTION EPIDURAL; INFILTRATION; INTRACAUDAL AS NEEDED
Status: DISCONTINUED | OUTPATIENT
Start: 2025-02-08 | End: 2025-02-08 | Stop reason: SURG

## 2025-02-08 RX ORDER — ONDANSETRON 2 MG/ML
4 INJECTION INTRAMUSCULAR; INTRAVENOUS ONCE
Status: COMPLETED | OUTPATIENT
Start: 2025-02-08 | End: 2025-02-08

## 2025-02-08 RX ORDER — BISACODYL 10 MG
10 SUPPOSITORY, RECTAL RECTAL ONCE AS NEEDED
Status: DISCONTINUED | OUTPATIENT
Start: 2025-02-08 | End: 2025-02-10

## 2025-02-08 RX ORDER — SODIUM CHLORIDE 9 MG/ML
10 INJECTION, SOLUTION INTRAMUSCULAR; INTRAVENOUS; SUBCUTANEOUS AS NEEDED
Status: DISCONTINUED | OUTPATIENT
Start: 2025-02-08 | End: 2025-02-08

## 2025-02-08 RX ORDER — SODIUM CHLORIDE 9 MG/ML
INJECTION, SOLUTION INTRAMUSCULAR; INTRAVENOUS; SUBCUTANEOUS
Status: DISPENSED
Start: 2025-02-08 | End: 2025-02-08

## 2025-02-08 RX ORDER — LIDOCAINE HYDROCHLORIDE AND EPINEPHRINE 15; 5 MG/ML; UG/ML
5 INJECTION, SOLUTION EPIDURAL AS NEEDED
Status: DISCONTINUED | OUTPATIENT
Start: 2025-02-08 | End: 2025-02-08

## 2025-02-08 RX ORDER — BUPIVACAINE HCL/0.9 % NACL/PF 0.25 %
PLASTIC BAG, INJECTION (ML) EPIDURAL
Status: DISCONTINUED
Start: 2025-02-08 | End: 2025-02-08 | Stop reason: WASHOUT

## 2025-02-08 RX ORDER — IBUPROFEN 600 MG/1
600 TABLET, FILM COATED ORAL ONCE AS NEEDED
Status: DISCONTINUED | OUTPATIENT
Start: 2025-02-08 | End: 2025-02-08 | Stop reason: HOSPADM

## 2025-02-08 RX ORDER — HYDROMORPHONE HYDROCHLORIDE 1 MG/ML
1 INJECTION, SOLUTION INTRAMUSCULAR; INTRAVENOUS; SUBCUTANEOUS EVERY 2 HOUR PRN
Status: DISCONTINUED | OUTPATIENT
Start: 2025-02-08 | End: 2025-02-08

## 2025-02-08 RX ORDER — ACETAMINOPHEN 500 MG
500 TABLET ORAL EVERY 6 HOURS PRN
Status: DISCONTINUED | OUTPATIENT
Start: 2025-02-08 | End: 2025-02-08

## 2025-02-08 RX ORDER — DEXTROSE, SODIUM CHLORIDE, SODIUM LACTATE, POTASSIUM CHLORIDE, AND CALCIUM CHLORIDE 5; .6; .31; .03; .02 G/100ML; G/100ML; G/100ML; G/100ML; G/100ML
INJECTION, SOLUTION INTRAVENOUS AS NEEDED
Status: DISCONTINUED | OUTPATIENT
Start: 2025-02-08 | End: 2025-02-08

## 2025-02-08 RX ORDER — PROPRANOLOL HYDROCHLORIDE 10 MG/1
1 TABLET ORAL 3 TIMES DAILY PRN
COMMUNITY
Start: 2024-12-13

## 2025-02-08 RX ORDER — HYDROMORPHONE HYDROCHLORIDE 1 MG/ML
1 INJECTION, SOLUTION INTRAMUSCULAR; INTRAVENOUS; SUBCUTANEOUS ONCE
Status: DISCONTINUED | OUTPATIENT
Start: 2025-02-08 | End: 2025-02-08

## 2025-02-08 RX ORDER — FERROUS GLUCONATE 324(37.5)
324 TABLET ORAL
COMMUNITY
Start: 2024-12-13 | End: 2025-02-10

## 2025-02-08 RX ORDER — BUPIVACAINE HYDROCHLORIDE 2.5 MG/ML
30 INJECTION, SOLUTION EPIDURAL; INFILTRATION; INTRACAUDAL AS NEEDED
Status: DISCONTINUED | OUTPATIENT
Start: 2025-02-08 | End: 2025-02-08

## 2025-02-08 RX ORDER — BETAMETHASONE SODIUM PHOSPHATE AND BETAMETHASONE ACETATE 3; 3 MG/ML; MG/ML
INJECTION, SUSPENSION INTRA-ARTICULAR; INTRALESIONAL; INTRAMUSCULAR; SOFT TISSUE
Status: DISCONTINUED
Start: 2025-02-08 | End: 2025-02-08

## 2025-02-08 RX ORDER — TERBUTALINE SULFATE 1 MG/ML
0.25 INJECTION SUBCUTANEOUS AS NEEDED
Status: DISCONTINUED | OUTPATIENT
Start: 2025-02-08 | End: 2025-02-08

## 2025-02-08 RX ORDER — NALBUPHINE HYDROCHLORIDE 10 MG/ML
2.5 INJECTION INTRAMUSCULAR; INTRAVENOUS; SUBCUTANEOUS
Status: DISCONTINUED | OUTPATIENT
Start: 2025-02-08 | End: 2025-02-08

## 2025-02-08 RX ORDER — MORPHINE SULFATE 4 MG/ML
4 INJECTION, SOLUTION INTRAMUSCULAR; INTRAVENOUS EVERY 30 MIN PRN
Status: DISCONTINUED | OUTPATIENT
Start: 2025-02-08 | End: 2025-02-08

## 2025-02-08 RX ORDER — IBUPROFEN 600 MG/1
600 TABLET, FILM COATED ORAL ONCE AS NEEDED
Status: DISCONTINUED | OUTPATIENT
Start: 2025-02-08 | End: 2025-02-08

## 2025-02-08 RX ORDER — IOHEXOL 350 MG/ML
100 INJECTION, SOLUTION INTRAVENOUS
Status: DISCONTINUED | OUTPATIENT
Start: 2025-02-08 | End: 2025-02-08

## 2025-02-08 RX ADMIN — BUPIVACAINE HYDROCHLORIDE 3 ML: 2.5 INJECTION, SOLUTION EPIDURAL; INFILTRATION; INTRACAUDAL at 07:51:00

## 2025-02-08 RX ADMIN — LIDOCAINE HCL/EPINEPHRINE/PF 3 ML: 2%-1:200K VIAL (ML) INJECTION at 12:14:00

## 2025-02-08 RX ADMIN — LIDOCAINE HCL/EPINEPHRINE/PF 5 ML: 2%-1:200K VIAL (ML) INJECTION at 12:16:00

## 2025-02-08 RX ADMIN — LIDOCAINE HYDROCHLORIDE AND EPINEPHRINE 3 ML: 15; 5 INJECTION, SOLUTION EPIDURAL at 08:05:00

## 2025-02-08 RX ADMIN — BUPIVACAINE HYDROCHLORIDE 5 ML: 2.5 INJECTION, SOLUTION EPIDURAL; INFILTRATION; INTRACAUDAL at 12:38:00

## 2025-02-08 RX ADMIN — BUPIVACAINE HYDROCHLORIDE 3 ML: 2.5 INJECTION, SOLUTION EPIDURAL; INFILTRATION; INTRACAUDAL at 07:53:00

## 2025-02-08 RX ADMIN — BUPIVACAINE HYDROCHLORIDE 3 ML: 2.5 INJECTION, SOLUTION EPIDURAL; INFILTRATION; INTRACAUDAL at 07:55:00

## 2025-02-08 NOTE — PROGRESS NOTES
Dr. Kim, Dr. Juarez and NICU staff at bedside. [place] delivery at 1427 with a 90 second shoulder dystocia resolved by suprapubic pressure, Aron maneuver and corkscrew maneuver.  Cord gases collected and sent. See flowsheet for resuscitation and vitals of . [place] intubated and transferred to NICU via isolette and NICU staff.

## 2025-02-08 NOTE — PROGRESS NOTES
KAE 4/90/-2. Plan of care d/w MD & relayed to pt, questions answered; mother at bedside, supportive. Pt requesting pain medication. Transferred to [facility] ambulatory in stable condition; oriented to room; bed low & locked; call light w/I reach; monitors reapplied. Report to Maya VELEZ RN.

## 2025-02-08 NOTE — ANESTHESIA PROCEDURE NOTES
Labor Analgesia    Date/Time: 2/8/2025 7:48 AM    Performed by: Meir Flores MD  Authorized by: Meir Flores MD      General Information and Staff    Start Time:  2/8/2025 7:48 AM  End Time:  2/8/2025 8:05 AM  Anesthesiologist:  Meir Flores MD  Performed by:  Anesthesiologist  Patient Location:  OB  Site Identification: surface landmarks    Reason for Block: labor epidural    Preanesthetic Checklist: patient identified, IV checked, risks and benefits discussed, monitors and equipment checked, pre-op evaluation, timeout performed, IV bolus, anesthesia consent and sterile technique used      Procedure Details    Patient Position:  Sitting  Prep: ChloraPrep    Monitoring:  Heart rate and continuous pulse ox  Approach:  Midline    Epidural Needle    Injection Technique:  DEBORAH air  Needle Type:  Tuohy  Needle Gauge:  17 G  Needle Length:  3.375 in (note, needle inserted fully, and skin tented deeply to achieve loss of resistance.)  Needle Insertion Depth:  9  Location:  L2-3    Spinal Needle      Catheter    Catheter Type:  End hole  Catheter Size:  19 G  Catheter at Skin Depth:  14  Test Dose:  Negative    Assessment    Sensory Level:  T8    Additional Comments          Medications:  Bupivacaine 0.25% 6ccs local infiltration to skin for placement.  Lidocaine 1% from prepackaged kit, administered epidural through tuohy 5ccs  Test dose from prepackaged kit Lidocaine 1.5% epinephrine 1:200,000  3ccs   Fentanyl 100micrograms (two ccs of 50mcgm/cc) given epidural through catheter.    Infusion then initiated per MAR standard solution infusion 12 ccs/hr, fentanyl 2mcgm/cc with bupivacaine 0.125%.  Demand function 5 ccs Q 20minutes limit 25ccs/hour.

## 2025-02-08 NOTE — H&P
Pt calling states [facility] needs to speak with Dr. Deanna Menjivar in regards to pt's insulin   Ph.1 . [facility]  History & Physical    Anne Robbins Patient Status:  Inpatient    1995 MRN [id number]   Location [facility] LABOR & DELIVERY Attending Virginie Waldrop MD   Hosp Day # 0 PCP Amina Duncan DO     SUBJECTIVE:    Anne Robbins is a 29 year old  female with EDC 3/14/25 at 35 and 1/7 weeks gestation who is being admitted for labor management.  Her current obstetrical history is significant for  no prenatal care , obesity.  Patient reports abdominal pain starting yesterday and leaking of yellow clear  since 25  .   Patient did not know she is pregnant and presented to ED with abdominal pain today, she is not sure when her LMP was, dated by today US    Obstetric History:   OB History    Para Term  AB Living   1 0 0 0 0 0   SAB IAB Ectopic Multiple Live Births   0 0 0 0 0      # Outcome Date GA Lbr [place]/2nd Weight Sex Type Anes PTL Lv   1 Current              Past Medical History:   Past Medical History:    Anemia    Anxiety    Hypothyroid    Prediabetes     Past Social History: No past surgical history on file.  Family History:   Family History   Problem Relation Age of Onset    Cancer Mother         breast    Other (abdifatah) Sister      Social History:   Social History     Tobacco Use    Smoking status: Never    Smokeless tobacco: Never   Substance Use Topics    Alcohol use: Yes     Comment: last use 2024       Home Meds: Prescriptions Prior to Admission[1]  Allergies: Allergies[2]    OBJECTIVE:    Temp:  [98.1 °F (36.7 °C)-98.3 °F (36.8 °C)] 98.3 °F (36.8 °C)  Pulse:  [] 100  Resp:  [15-20] 16  BP: ()/(52-86) 140/59  SpO2:  [93 %-100 %] 100 %    Lungs:   clear to auscultation bilaterally   Heart:   regular rate and rhythm, S1, S2 normal, no murmur, click, rub or gallop   Abdomen: FHT present   Fetal Surveillance:  145 BPM   Fetal heart variability: moderate      Cervix: dilation 4 cm/90%/-2     Lab Review:  O, Rh+, Rubella-immune, Hepatitis  B surface antigen non-reactive  Recent Labs   Lab 02/08/25  0307   RBC 4.25   HGB 11.4*   HCT 33.2*   MCV 78.1*   MCH 26.8   MCHC 34.3   RDW 14.8   NEPRELIM 8.62*   WBC 11.0   .0       Recent Labs   Lab 02/08/25  0307   *   BUN <5*   CREATSERUM 0.67   EGFRCR 122   CA 8.8   ALB 3.6      K 3.5      CO2 19.0*   ALKPHO 205*   AST 12   ALT <7*   BILT 0.6   TP 7.1          ASSESSMENT:    35 and 1/7 weeks gestation.  PPROM and Active phase labor.  Obstetrical history significant for  no prenatal care, dating by 3rd trimestr US, obesity.    PLAN:    Risks, benefits, alternatives and possible complications have been discussed in detail with the patient.  Pre-admission, admission, and post admission procedures and expectations were discussed in detail.  All questions answered, all appropriate consents will be signed at the Hospital. Admission is planned for today.   - magnesium sulfate for neuro protection  - ampicillin.  - IUPC placed  - betamethasone x1 given    Nette Kim DO  2/8/2025  8:55 AM       [1]   Medications Prior to Admission   Medication Sig Dispense Refill Last Dose/Taking    propranolol 10 MG Oral Tab Take 1 tablet (10 mg total) by mouth 3 (three) times daily as needed.       Ferrous Gluconate 324 (37.5 Fe) MG Oral Tab Take 1 tablet (324 mg total) by mouth daily with breakfast.       Cholecalciferol 50 MCG (2000 UT) Oral Tab Take 50 mcg by mouth daily.      [2]   Allergies  Allergen Reactions    Shellfish-Derived Products Tightness in Throat    Shrimp ANAPHYLAXIS     Can eat crab and lobster

## 2025-02-08 NOTE — L&D DELIVERY NOTE
Rosendo, Girl [[id number]]      Labor Events     labor?: No   steroids?: None  Antibiotics received during labor?: Yes  Antibiotics (enter # doses in comment): ampicillin  Rupture date/time: 2025     Rupture type: SROM  Fluid color: Clear  Labor type: Spontaneous Onset of Labor  Augmentation: Oxytocin  Indications for augmentation: Ineffective Contraction Pattern  Intrapartum & labor complications: No prenatal care, Other - see comments  Intrapartum & labor complications comment: PPROM 25       Labor Event Times    Labor onset date/time: 2025 0700  Dilation complete date/time: 2025 1320       [place] Presentation    Presentation: Vertex  Position: Occiput Anterior       Operative Delivery    No data filed       Shoulder Dystocia    No data filed       Anesthesia    Method: Epidural, Local              [place] Delivery      Delivery date/time:  25 14:27:30   Delivery type: Normal spontaneous vaginal delivery    Details:     Delivery location: delivery room       Delivery Providers    Delivering Clinician: Nette Kim DO   Delivery personnel:  Provider Role    Baby Nurse   Aziza Newton RN Delivery Nurse             Cord    No data filed        Measurements    No data filed       Placenta    No data filed       Apgars    No data filed       Skin to Skin    No data filed       Vaginal Count    Initial count RN: Aziza Newton RN  Initial count Tech: Marshall, Magali A   Sponges   Sharps    Initial counts 11   0    Final counts               Lacerations    Episiotomy: Median  Indications for episiotomy: Fetal Intolerance of Labor              29 y.o.  with no prenatal care, presented with PPROM for 4 days and uterine contractions, fetus measuring 35w1d on US today. Patient progressed to complete dilation and pushed 1 hour,head delivered, turtle sign recognized.  Midline episiotomy performed   [place], suprapubic pressure, wood screw maneuvers performed, right  posterior shoulder released .Baby's body delivered 90 seconds after the head .Cord clamped and cut.Baby is handed to waiting neonatologist. Female infant. APGARS and weight pending  Placenta delivered spontaneously, intact. Midline episiotomy and left vaginal laceration repaired with 2-0 chromic. Good hemostasis   ml

## 2025-02-08 NOTE — PLAN OF CARE
Problem: SAFETY ADULT - FALL  Goal: Free from fall injury  Description: INTERVENTIONS:  - Assess pt frequently for physical needs  - Identify cognitive and physical deficits and behaviors that affect risk of falls.  - [place] fall precautions as indicated by assessment.  - Educate pt/family on patient safety including physical limitations  - Instruct pt to call for assistance with activity based on assessment  - Modify environment to reduce risk of injury  - Provide assistive devices as appropriate  - Consider OT/PT consult to assist with strengthening/mobility  - Encourage toileting schedule  2025 by Aziza Newton RN  Outcome: Completed  2025 by Aziza Newton RN  Outcome: Progressing     Problem: BIRTH - VAGINAL/ SECTION  Goal: Fetal and maternal status remain reassuring during the birth process  Description: INTERVENTIONS:  - Monitor vital signs  - Monitor fetal heart rate  - Monitor uterine activity  - Monitor labor progression (vaginal delivery)  - DVT prophylaxis (C/S delivery)  - Surgical antibiotic prophylaxis (C/S delivery)  2025 by Aziza Newton, RN  Outcome: Completed  2025 by Aziza Newton RN  Outcome: Progressing     Problem: PAIN - ADULT  Goal: Verbalizes/displays adequate comfort level or patient's stated pain goal  Description: INTERVENTIONS:  - Encourage pt to monitor pain and request assistance  - Assess pain using appropriate pain scale  - Administer analgesics based on type and severity of pain and evaluate response  - Implement non-pharmacological measures as appropriate and evaluate response  - Consider cultural and social influences on pain and pain management  - Manage/alleviate anxiety  - Utilize distraction and/or relaxation techniques  - Monitor for opioid side effects  - Notify MD/LIP if interventions unsuccessful or patient reports new pain  - Anticipate increased pain with activity and pre-medicate as appropriate  2025 by Aman  Aziza, ANNAMARIE  Outcome: Completed  2/8/2025 0844 by Aziza Newton, RN  Outcome: Progressing     Problem: ANXIETY  Goal: Will report anxiety at manageable levels  Description: INTERVENTIONS:  - Administer medication as ordered  - Teach and rehearse alternative coping skills  - Provide emotional support with 1:1 interaction with staff  2/8/2025 1718 by Aziza Newton, RN  Outcome: Completed  2/8/2025 0844 by Aziza Newton, RN  Outcome: Progressing     Problem: Patient/Family Goals  Goal: Patient/Family Long Term Goal  Description: Patient's Long Term Goal: uncomplicated delivery    Interventions:  - VS per protocol  I&O  Ice chips and sips as tolerated  EFM per protocol  Maintain IV as ordered  Antibiotics as needed per protocol  Informed consent      - See additional Care Plan goals for specific interventions  2/8/2025 1718 by Aziza Newton, RN  Outcome: Completed  2/8/2025 0844 by Aziza Newton, RN  Outcome: Progressing  Goal: Patient/Family Short Term Goal  Description: Patient's Short Term Goal: pain relief from contractions    Interventions:   - dilaudid  epidural  - See additional Care Plan goals for specific interventions  2/8/2025 1718 by Aziza Newton, RN  Outcome: Completed  2/8/2025 0844 by Aziza Newton, RN  Outcome: Progressing

## 2025-02-08 NOTE — ED INITIAL ASSESSMENT (HPI)
3 days of abdominal cramping and vaginal discharge, on and of throughout the day. Pt states each session lasts for a couple of minutes at a time. Pt has tried otc meds and heating pad to no avail. No periods since July 2024 - preg neg back in December. +vomiting and diarrhea since yesterday. X3 episodes diarrhea since midnight. No other concerns at this time.

## 2025-02-08 NOTE — PROGRESS NOTES
Pt admitted to room 114. Pt orientated to room, call light within reach, bed in low locked position.

## 2025-02-08 NOTE — ANESTHESIA PREPROCEDURE EVALUATION
PRE-OP EVALUATION    Patient Name: Anne Robbins    Admit Diagnosis: pregnancy  Pregnancy (HCC)    Pre-op Diagnosis: * No surgery found *      Term pregnancy in painful labor with requested analgesia.    Anesthesia Procedure: LABOR ANALGESIA    * Surgery not found *    Pre-op vitals reviewed.  Temp: 98.3 °F (36.8 °C)  Pulse: 91  Resp: 20  BP: 152/67  SpO2: 95 %  There is no height or weight on file to calculate BMI.    Current medications reviewed.  Hospital Medications:   [COMPLETED] sodium chloride 0.9 % IV bolus 1,000 mL  1,000 mL Intravenous Once    [COMPLETED] ondansetron (Zofran) 4 MG/2ML injection 4 mg  4 mg Intravenous Once    acetaminophen (Tylenol Extra Strength) tab 500 mg  500 mg Oral Q6H PRN    acetaminophen (Tylenol Extra Strength) tab 1,000 mg  1,000 mg Oral Q6H PRN    ibuprofen (Motrin) tab 600 mg  600 mg Oral Once PRN    ondansetron (Zofran) 4 MG/2ML injection 4 mg  4 mg Intravenous Q6H PRN    oxyTOCIN in sodium chloride 0.9% (Pitocin) 30 Units/500mL infusion premix  62.5-900 benjamin-units/min Intravenous Continuous    terbutaline (Brethine) 1 MG/ML injection 0.25 mg  0.25 mg Subcutaneous PRN    sodium citrate-citric acid (Bicitra) 500-334 MG/5ML oral solution 30 mL  30 mL Oral PRN    lactated ringers infusion   Intravenous Continuous    dextrose in lactated ringers 5% infusion   Intravenous PRN    lactated ringers IV bolus 500 mL  500 mL Intravenous PRN    ampicillin (Omnipen) 2 g in sodium chloride 0.9% 100 mL IVPB-MBP  2 g Intravenous Once    Followed by    ampicillin (Omnipen) 1 g in sodium chloride 0.9% 100 mL IVPB-MBP  1 g Intravenous Q4H    betamethasone sodium phosphate & acetate (Celestone) 6 (3-3) MG/ML injection 12 mg  12 mg Intramuscular Q24H    HYDROmorphone (Dilaudid) 1 MG/ML injection 1 mg  1 mg Intravenous Q2H PRN    lactated ringers IV bolus 1,000 mL  1,000 mL Intravenous Once    fentaNYL-bupivacaine 2 mcg/mL-0.125% in sodium chloride 0.9% 100 mL EPIDURAL infusion premix  12  mL/hr Epidural Continuous    fentaNYL (Sublimaze) 50 mcg/mL injection 100 mcg  100 mcg Epidural Once    lidocaine 1.5%-EPINEPHrine 1:200,000 (Xylocaine-Epinephrine) injection  5 mL Injection PRN    bupivacaine PF (Marcaine) 0.25% injection  30 mL Injection PRN    lidocaine PF (Xylocaine-MPF) 2% injection  5 mL Injection PRN    sodium chloride 0.9% PF injection 10 mL  10 mL Injection PRN    ePHEDrine (PF) 25 MG/5 ML injection 5 mg  5 mg Intravenous PRN    nalbuphine (Nubain) 10 mg/mL injection 2.5 mg  2.5 mg Intravenous Q15 Min PRN    magnesium sulfate 40 mg/mL 6 g BOLUS FROM BAG infusion *For OB Use*  6 g Intravenous Once    Followed by    magnesium sulfate 40 mg/mL infusion premix  2 g/hr Intravenous Continuous    calcium gluconate injection 1 g  1 g Intravenous Once PRN    fentaNYL-bupivacaine in sodium chloride 0.9% 2 mcg/mL-0.125% EPIDURAL infusion premix        sodium chloride 0.9% PF 0.9% injection        fentaNYL (Sublimaze) 50 mcg/mL injection        ePHEDrine (PF) 25 MG/5 ML injection           Outpatient Medications:   Prescriptions Prior to Admission[1]    Allergies: Shellfish-derived products and Shrimp      Anesthesia Evaluation    Patient summary reviewed.    Anesthetic Complications  (-) history of anesthetic complications         GI/Hepatic/Renal    Negative GI/hepatic/renal ROS.                             Cardiovascular    Negative cardiovascular ROS.    Exercise tolerance: good     MET: >4    (+) obesity                                       Endo/Other    Negative endo/other ROS.                              Pulmonary    Negative pulmonary ROS.                       Neuro/Psych    Negative neuro/psych ROS.                          As per Epic:  Patient Active Problem List:     Hypothyroid     Prediabetes     Medication monitoring encounter     Active  labor (HCC)     Pregnancy (HCC)          No past surgical history on file.  Social History     Socioeconomic History    Marital status:  Single   Occupational History    Occupation: student   Tobacco Use    Smoking status: Never    Smokeless tobacco: Never   Vaping Use    Vaping status: Never Used   Substance and Sexual Activity    Alcohol use: Yes     Comment: last use 1/31/2024    Drug use: Yes     Types: Cannabis     Comment: edibles a few times a year     History   Drug Use    Types: Cannabis     Comment: edibles a few times a year     Available pre-op labs reviewed.  Lab Results   Component Value Date    WBC 11.0 02/08/2025    RBC 4.25 02/08/2025    HGB 11.4 (L) 02/08/2025    HCT 33.2 (L) 02/08/2025    MCV 78.1 (L) 02/08/2025    MCH 26.8 02/08/2025    MCHC 34.3 02/08/2025    RDW 14.8 02/08/2025    .0 02/08/2025     Lab Results   Component Value Date     02/08/2025    K 3.5 02/08/2025     02/08/2025    CO2 19.0 (L) 02/08/2025    BUN <5 (L) 02/08/2025    CREATSERUM 0.67 02/08/2025     (H) 02/08/2025    CA 8.8 02/08/2025            Airway      Mallampati: III  Mouth opening: >3 FB  TM distance: > 6 cm  Neck ROM: full Cardiovascular      Rhythm: regular  Rate: normal  (-) murmur   Dental  Comment: Dentition is grossly intact;  Patient does not demonstrate loose teeth to inspection.           Pulmonary  Comment: Unlabored ventilatory effort, no retractions.  Pulmonary exam normal.  Breath sounds clear to auscultation bilaterally.               Other findings  airway reasonable            ASA: 3   Plan: epidural  NPO status verified and patient meets guidelines.        Comment: Continuous labor epidural analgesia is planned.   Patient and family are aware of risks post dural puncture headache, unilateral block, and potential failure of pain relief.  Implied distant risks include spinal hematoma and infection.  Understanding of risks is demonstrated, and wishes proceed with labor analgesia.    Patient aunt at bedside to be present during epidural placement.  Plan/risks discussed with: patient and mother  Use of blood  product(s) discussed with: patient and mother              Present on Admission:   Pregnancy (HCC)             [1]   Medications Prior to Admission   Medication Sig Dispense Refill Last Dose/Taking    propranolol 10 MG Oral Tab Take 1 tablet (10 mg total) by mouth 3 (three) times daily as needed.       Ferrous Gluconate 324 (37.5 Fe) MG Oral Tab Take 1 tablet (324 mg total) by mouth daily with breakfast.       Cholecalciferol 50 MCG (2000 UT) Oral Tab Take 50 mcg by mouth daily.

## 2025-02-08 NOTE — ED PROVIDER NOTES
Patient Seen in: [facility] Emergency Department      History     Chief Complaint   Patient presents with    Abdomen/Flank Pain     Complication     Pt started having abdominal pain Wednesday; was unaware of pregnancy. Had some spotting w/ mucous but denies darcy bleeding; denies fluid leaking.     Stated Complaint: PT States bad spasms and pain in lower abdomen    Subjective:   HPI      Patient is a 29-year-old female presents to ED for evaluation of abdominal pain.  Patient complains of abdominal pain for the last 4 days in the middle of her abdomen.  She states it is constant but spasms.  She has had 2 episodes of vomiting yesterday.  She has had about 3 or 4 episodes of diarrhea.  Patient states she has not had a menstrual cycle since July.  She is not sexually active.  She had some yellow discharge from her vagina a few days ago.  She states she passed a small amount of blood a few days ago.  She denies any fevers.  No history of abdominal surgery.  She states she had a negative pregnancy test ordered by her doctor in December.  Upon review of her records, she had a positive quantitative beta-hCG 2024 of 3483.  She received a text message which she showed me from her doctor stating she was not pregnant in order to have a CT of her abdomen which she states was canceled.  She has never been pregnant before.  Her urine pregnancy test is positive here.    Objective:     Past Medical History:    Hypothyroid    Prediabetes              History reviewed. No pertinent surgical history.             Social History     Socioeconomic History    Marital status: Single   Occupational History    Occupation: student   Tobacco Use    Smoking status: Never    Smokeless tobacco: Never   Vaping Use    Vaping status: Never Used   Substance and Sexual Activity    Alcohol use: Yes    Drug use: Never                  Physical Exam     ED Triage Vitals [25 0228]   BP (!) 174/74   Pulse 110   Resp 20   Temp  98.1 °F (36.7 °C)   Temp src Oral   SpO2 97 %   O2 Device None (Room air)       Current Vitals:   Vital Signs  BP: 129/81  Pulse: 81  Resp: 20  Temp: 98.1 °F (36.7 °C)  Temp src: Oral  MAP (mmHg): 94    Oxygen Therapy  SpO2: 100 %  O2 Device: None (Room air)        Physical Exam  GENERAL: No acute distress, well appearing and non-toxic, Alert and oriented X 3.  Patient with elevated BMI  HEENT: Normocephalic, atraumatic.  Moist mucous membranes.  Pupils equal round reactive to light and accommodation, extraocular motion is intact, sclerae white, conjunctiva is pink.  Oropharynx is unremarkable, no exudate.  NECK: Supple, trachea midline, no lymphadenopathy.   LUNG: Lungs clear to auscultation bilaterally, no wheezing, no rales, no rhonchi.  CARDIOVASCULAR: Regular rate and rhythm.  Normal S1S2.  No S3S4 or murmur.  ABDOMEN: Bowel sounds are present. Soft. nondistended, no pulsatile masses.  Patient has maximal tenderness in the epigastrium.  She seems to have a potential mass in the epigastrium.  She has milder diffuse abdominal tenderness.  MUSCULOSKELETAL: No calf tenderness.  Dorsalis and Posterior Tibial pulses present. No clubbing. No cyanosis.  No edema.   SKIN EXAMINATIoN: Warm and dry with normal appearance.  No rashes or lesions.  NEUROLOGICAL:  Motor strength intact all groups.  normal sensation, speech intact    ED Course     Labs Reviewed   COMP METABOLIC PANEL (14) - Abnormal; Notable for the following components:       Result Value    Glucose 114 (*)     CO2 19.0 (*)     BUN <5 (*)     ALT <7 (*)     Alkaline Phosphatase 205 (*)     All other components within normal limits   CBC WITH DIFFERENTIAL WITH PLATELET - Abnormal; Notable for the following components:    HGB 11.4 (*)     HCT 33.2 (*)     MCV 78.1 (*)     Neutrophil Absolute Prelim 8.62 (*)     Neutrophil Absolute 8.62 (*)     All other components within normal limits   HCG, BETA SUBUNIT, QUAL - Abnormal; Notable for the following components:     HCG, Serum Qualitative (S) Positive (*)     All other components within normal limits   HCG, BETA SUBUNIT (QUANT PREGNANCY TEST) - Abnormal; Notable for the following components:    Hcg Quantitative 4,376.0 (*)     All other components within normal limits   POCT PREGNANCY URINE - Abnormal; Notable for the following components:    POCT Urine Pregnancy Positive (*)     All other components within normal limits   LIPASE - Normal   ABORH (BLOOD TYPE)   Hemoglobin 11.4.  Quantitative beta-hCG 4376                MDM      Patient is a 29-year-old female presents to ED for evaluation of abdominal pain, vomiting, diarrhea.  Patient differential includes gastroenteritis, intrauterine pregnancy, ectopic pregnancy.  Pregnancy test positive here as well as in 2024.  History of irregular menstrual cycles.  Once pregnancy test was found to be positive, I performed bedside ultrasound showing a likely fetus that appears full-term.  Contacted the ultrasound tech and they came to do bedside tach and she had a late term pregnancy measuring at least 35 weeks.  Patient case was discussed with OB charge nurse and she will be sent to directly to L&D for active labor.  Critical care time was 60 minutes. This critical care time is exclusive of procedures critical care time includes monitoring of patient's cardiopulmonary and hemodynamic status, interpretation of laboratory values, and discussion of case with physician and consultants.      External and old record review was performed.  I reviewed laboratory test through care everywhere including positive pregnancy test 2024 with details listed above      Medical Decision Making      Disposition and Plan     Clinical Impression:  1. Active  labor (HCC)         Disposition:  Send to l&d  2025  4:51 am    Follow-up:  No follow-up provider specified.        Medications Prescribed:  Current Discharge Medication List              Supplementary Documentation:

## 2025-02-08 NOTE — H&P
[facility]  History & Physical    Anne Robbins Patient Status:  Inpatient    1995 MRN [id number]   Location [facility] LABOR & DELIVERY Attending Virginie Waldrop MD   Hosp Day # 0 PCP Peg[place] DO [place]     Subjective:   Patient presented to ER with abdominal pain.  Diagnosed with pregnancy with US in ER with possible gestational age at 35 weeks, however patient states LMP in July with EDC 2025 - Gestational age at 28 5/7.  In triage UCx noted irregular, SVE at 4cm/90 - 2. Unknown rupture of membranes.No prenatal care.    Objective:  Vital signs in last 24 hours:  Temp:  [98.1 °F (36.7 °C)-98.3 °F (36.8 °C)] 98.3 °F (36.8 °C)  Pulse:  [] 94  Resp:  [20] 20  BP: (129-174)/(69-81) 134/69  SpO2:  [97 %-100 %] 100 %      General:   alert, appears stated age and cooperative   Skin:   normal   HEENT:  PERRLA   Lungs:   clear to auscultation bilaterally   Heart:   regular rate and rhythm   Breasts:   normal without suspicious masses   Abdomen:  normal findings: Gravid   FHT:  135 BPM - Cat 1   Uterine Size: Difficult to assess due to habitus.   Presentations: cephalic   Cervix:     Dilation: 4    Effacement: 90    Station:  -2    Consistency: medium    Position: anterior      CBC:    Lab Results   Component Value Date    WBC 11.0 2025    WBC 5.76 2022    WBC 5.05 04/15/2021     Lab Results   Component Value Date    HGB 11.4 (L) 2025    HGB 12.0 2022    HGB 11.9 (L) 04/15/2021      Lab Results   Component Value Date    .0 2025     2022     04/15/2021       Assessment/Plan: IUP with Bedside US at 35 weeks, dates at 28 5/7 weeks  Plan admission, pain control.  Plan Magnesium for neuroprotection, Ampicillin for unknown GBS.  BMTZ for FLM  Plan MFM consult for EFW and EGA.

## 2025-02-08 NOTE — PLAN OF CARE
Problem: SAFETY ADULT - FALL  Goal: Free from fall injury  Description: INTERVENTIONS:  - Assess pt frequently for physical needs  - Identify cognitive and physical deficits and behaviors that affect risk of falls.  - [place] fall precautions as indicated by assessment.  - Educate pt/family on patient safety including physical limitations  - Instruct pt to call for assistance with activity based on assessment  - Modify environment to reduce risk of injury  - Provide assistive devices as appropriate  - Consider OT/PT consult to assist with strengthening/mobility  - Encourage toileting schedule  Outcome: Progressing     Problem: BIRTH - VAGINAL/ SECTION  Goal: Fetal and maternal status remain reassuring during the birth process  Description: INTERVENTIONS:  - Monitor vital signs  - Monitor fetal heart rate  - Monitor uterine activity  - Monitor labor progression (vaginal delivery)  - DVT prophylaxis (C/S delivery)  - Surgical antibiotic prophylaxis (C/S delivery)  Outcome: Progressing     Problem: PAIN - ADULT  Goal: Verbalizes/displays adequate comfort level or patient's stated pain goal  Description: INTERVENTIONS:  - Encourage pt to monitor pain and request assistance  - Assess pain using appropriate pain scale  - Administer analgesics based on type and severity of pain and evaluate response  - Implement non-pharmacological measures as appropriate and evaluate response  - Consider cultural and social influences on pain and pain management  - Manage/alleviate anxiety  - Utilize distraction and/or relaxation techniques  - Monitor for opioid side effects  - Notify MD/LIP if interventions unsuccessful or patient reports new pain  - Anticipate increased pain with activity and pre-medicate as appropriate  Outcome: Progressing     Problem: ANXIETY  Goal: Will report anxiety at manageable levels  Description: INTERVENTIONS:  - Administer medication as ordered  - Teach and rehearse alternative coping skills  -  Provide emotional support with 1:1 interaction with staff  Outcome: Progressing     Problem: Patient/Family Goals  Goal: Patient/Family Long Term Goal  Description: Patient's Long Term Goal: uncomplicated delivery    Interventions:  - VS per protocol  I&O  Ice chips and sips as tolerated  EFM per protocol  Maintain IV as ordered  Antibiotics as needed per protocol  Informed consent      - See additional Care Plan goals for specific interventions  Outcome: Progressing  Goal: Patient/Family Short Term Goal  Description: Patient's Short Term Goal: pain relief from contractions    Interventions:   - dilaudid  epidural  - See additional Care Plan goals for specific interventions  Outcome: Progressing

## 2025-02-08 NOTE — PLAN OF CARE
Problem: SAFETY ADULT - FALL  Goal: Free from fall injury  Description: INTERVENTIONS:  - Assess pt frequently for physical needs  - Identify cognitive and physical deficits and behaviors that affect risk of falls.  - [place] fall precautions as indicated by assessment.  - Educate pt/family on patient safety including physical limitations  - Instruct pt to call for assistance with activity based on assessment  - Modify environment to reduce risk of injury  - Provide assistive devices as appropriate  - Consider OT/PT consult to assist with strengthening/mobility  - Encourage toileting schedule  Outcome: Progressing     Problem: BIRTH - VAGINAL/ SECTION  Goal: Fetal and maternal status remain reassuring during the birth process  Description: INTERVENTIONS:  - Monitor vital signs  - Monitor fetal heart rate  - Monitor uterine activity  - Monitor labor progression (vaginal delivery)  - DVT prophylaxis (C/S delivery)  - Surgical antibiotic prophylaxis (C/S delivery)  Outcome: Progressing     Problem: PAIN - ADULT  Goal: Verbalizes/displays adequate comfort level or patient's stated pain goal  Description: INTERVENTIONS:  - Encourage pt to monitor pain and request assistance  - Assess pain using appropriate pain scale  - Administer analgesics based on type and severity of pain and evaluate response  - Implement non-pharmacological measures as appropriate and evaluate response  - Consider cultural and social influences on pain and pain management  - Manage/alleviate anxiety  - Utilize distraction and/or relaxation techniques  - Monitor for opioid side effects  - Notify MD/LIP if interventions unsuccessful or patient reports new pain  - Anticipate increased pain with activity and pre-medicate as appropriate  Outcome: Progressing     Problem: ANXIETY  Goal: Will report anxiety at manageable levels  Description: INTERVENTIONS:  - Administer medication as ordered  - Teach and rehearse alternative coping skills  -  Provide emotional support with 1:1 interaction with staff  Outcome: Progressing     Problem: Patient/Family Goals  Goal: Patient/Family Long Term Goal  Description: Patient's Long Term Goal: uncomplicated delivery    Interventions:  - VS per protocol  I&O  Ice chips and sips as tolerated  EFM per protocol  Maintain IV as ordered  Antibiotics as needed per protocol  Informed consent      - See additional Care Plan goals for specific interventions  Outcome: Progressing  Goal: Patient/Family Short Term Goal  Description: Patient's Short Term Goal: pain relief from contractions    Interventions:   - dilaudid  epidural  - See additional Care Plan goals for specific interventions  Outcome: Progressing

## 2025-02-08 NOTE — PROGRESS NOTES
Pt is a 29 year old female admitted to TRG4/TRG4-A.     Chief Complaint   Patient presents with    Abdomen/Flank Pain     Complication     Pt started having abdominal pain Wednesday, more severe yesterday; was unaware of pregnancy. Had some spotting yesterday w/ mucous but denies darcy bleeding; denies fluid leaking.       Pt is  28w5d intra-uterine pregnancy.  History obtained. Oriented to room, staff, and plan of care.    Pt arrived from ED via stretcher following diagnosis of pregnancy, A&O x4, breathing through ctxs, cooperative w/ care team & verbalizes understanding & agreement to initial plan of care.

## 2025-02-09 LAB
BASOPHILS # BLD AUTO: 0.02 X10(3) UL (ref 0–0.2)
BASOPHILS NFR BLD AUTO: 0.1 %
EOSINOPHIL # BLD AUTO: 0 X10(3) UL (ref 0–0.7)
EOSINOPHIL NFR BLD AUTO: 0 %
ERYTHROCYTE [DISTWIDTH] IN BLOOD BY AUTOMATED COUNT: 14.6 %
HCT VFR BLD AUTO: 29.9 %
HGB BLD-MCNC: 10 G/DL
IMM GRANULOCYTES # BLD AUTO: 0.09 X10(3) UL (ref 0–1)
IMM GRANULOCYTES NFR BLD: 0.6 %
LYMPHOCYTES # BLD AUTO: 1.32 X10(3) UL (ref 1–4)
LYMPHOCYTES NFR BLD AUTO: 8.5 %
MCH RBC QN AUTO: 26.8 PG (ref 26–34)
MCHC RBC AUTO-ENTMCNC: 33.4 G/DL (ref 31–37)
MCV RBC AUTO: 80.2 FL
MONOCYTES # BLD AUTO: 0.82 X10(3) UL (ref 0.1–1)
MONOCYTES NFR BLD AUTO: 5.3 %
NEUTROPHILS # BLD AUTO: 13.24 X10 (3) UL (ref 1.5–7.7)
NEUTROPHILS # BLD AUTO: 13.24 X10(3) UL (ref 1.5–7.7)
NEUTROPHILS NFR BLD AUTO: 85.5 %
PLATELET # BLD AUTO: 315 10(3)UL (ref 150–450)
RBC # BLD AUTO: 3.73 X10(6)UL
WBC # BLD AUTO: 15.5 X10(3) UL (ref 4–11)

## 2025-02-09 NOTE — PROGRESS NOTES
PPD#1  Pt without complaints - pain adequately cntrolled  BP 98/63 (BP Location: Left arm)   Pulse 68   Temp 98.9 °F (37.2 °C) (Oral)   Resp 18   LMP  (Approximate)   SpO2 97%   Breastfeeding Yes   Fundus firm at U  Lochia - appropriate  Extrem - NT  WBC - 15.5  Hgb - 10  A/P Doing well  - Baby in NICU

## 2025-02-09 NOTE — PROGRESS NOTES
Labor Analgesia Follow Up Note    Patient underwent epidural anesthesia for labor analgesia,    Placenta Date/Time: 2/8/2025  2:29 PM    Delivery Date/Time:: 2/8/2025  2:27 PM    /75 (BP Location: Right arm)   Pulse 62   Temp 97.7 °F (36.5 °C) (Oral)   Resp 18   LMP  (Approximate)   SpO2 97%   Breastfeeding Yes     Assessment:  Patient seen and L thigh numbness has resolved. Full strength of BLEs.  no apparent anesthesia related complications.    Thank you for asking us to participate in the care of your patient.

## 2025-02-09 NOTE — PLAN OF CARE

## 2025-02-10 VITALS
RESPIRATION RATE: 16 BRPM | TEMPERATURE: 98 F | SYSTOLIC BLOOD PRESSURE: 120 MMHG | OXYGEN SATURATION: 97 % | HEART RATE: 72 BPM | DIASTOLIC BLOOD PRESSURE: 78 MMHG

## 2025-02-10 NOTE — PROGRESS NOTES
On call Laborist 02/10/25    [facility]  Post-Partum Progress Note    Anne Robbins Patient Status:  Inpatient    1995 MRN [id number]   Location [facility] 2SW-J Attending Virginie Waldrop MD   Hosp Day # 2 PCP Amina Duncan DO     SUBJECTIVE:    Postpartum Day 2:    The patient feels well. The patient denies emotional concerns. Pain is well controlled with current medications. The baby is stable in NICU. The patient tolerating a normal diet.  Lochia is appropriate.    OBJECTIVE:    Vital signs in last 24 hours:  Temp:  [97.6 °F (36.4 °C)-97.9 °F (36.6 °C)] 97.9 °F (36.6 °C)  Pulse:  [68-74] 72  Resp:  [16-18] 16  BP: (115-126)/(55-80) 120/78  SpO2:  [97 %] 97 %  Input/Output:  [unfilled]    General:    alert, appears stated age, and cooperative   Uterine Fundus:   firm, below the umbilicus   DVT Evaluation:  no evidence of DVT     Data Reviewed:  Recent Labs   Lab 25  0307 25  0808   RBC 4.25 3.73*   HGB 11.4* 10.0*   HCT 33.2* 29.9*   MCV 78.1* 80.2   MCH 26.8 26.8   MCHC 34.3 33.4   RDW 14.8 14.6   NEPRELIM 8.62* 13.24*   WBC 11.0 15.5*   .0 315.0         ASSESSMENT/PLAN:    Status post Vaginal Delivery - doing well  Reviewed DC instructions  Follow up with [facility] for 6 wk check    Virginie Waldrop MD  2/10/2025  12:19 PM

## 2025-02-10 NOTE — DISCHARGE INSTRUCTIONS
Post Vaginal Delivery Home Care Instructions     Anne-  We hope you were pleased with your care at [facility].  We wish you the best outcome and overall experience with the delivery of your baby.  These instructions will help to minimize pain, limit the risk for an infection, and improve the likelihood of a successful recovery.    What to Expect:  Abdominal cramping after delivery especially if you are breastfeeding   Vaginal bleeding for about 4-6 weeks that may be followed by a yellow or white discharge for a few more weeks.  Your period will resume in approximately 6-8 weeks, unless you are breastfeeding    If you are bottle feeding, you may notice breast engorgement in about 3 days.  Your breast may be sore and hard. Please wear a tight fitted bra or sports bra for 24-36 hours to help prevent your breast from producing milk, and use ice packs to relive any discomfort  If you are breastfeeding, nipple dryness is very common the first few days    Constipation is common after having a baby, please increase fluid and fiber in your diet     Over-The-Counter Medication  Non-prescription anti-inflammatory medications can also help to ease the pain.  You may take Aleve, Tylenol or Ibuprofen   Colace or Metamucil for Constipation  Lanolin for dry nipples  Tucks, Witch Hazel and Epifoam for Episiotomy discomfort   Drink a full glass of water with oral medication and take as directed    Wound Care  The following instructions will promote proper healing and help to prevent infection  Episiotomy Care: Sitz Bath for 15mins, 2-3 times a day    Bathing/Showers  You may resume showers  No baths, swimming, hot tubs until your post-partum visit    Home Medication  Resume your home medications as instructed    Diet  Resume your normal diet    Activity  Refrain from vaginal intercourse, vaginal suppositories, tampon use or douches until after your post-partum visit  No exercising for 4 weeks  You may climb stairs minimally for  the first week    Do not do heavy housework for at least 2-3 weeks    Return to Work or School  You may return to work in approximately 6 weeks  Contact your obstetrician’s office, if you need a medical release form/letter    Driving  Avoid driving if you are taking narcotics for pain relief    Follow-up Appointment with Your Obstetrician  Call your obstetrician’s office today for an appointment in 4-6 weeks    Verify your appointment date, day, time, and location  At your first post-partum office visit, your progress will be evaluated, findings reviewed, and any additional concerns and instructions will be discussed    Questions or Concerns  Call your obstetrician’s office if you experience the following:  Severe pain not controlled by pain medication  Foul smelling vaginal discharge  Heavy bleeding  Shortness of breath  Fever  Redness, increased swelling or drainage from your incision  Crying and periods of sadness that prevents you from caring for yourself and your baby  Burning sensation during urination or inability to urinate  Swelling, redness or abnormal warmth to your leg/calf    Anne  Thank you for coming to [facility] to start your new family.  The nurses and the anesthesiologists try very hard to make sure you receive the best care possible.  Your trust in them as well as us is greatly appreciated.      POST-PARTUM DEPRESSION SCREENING FOLLOW-UP RESOURCES  Mom's Line at [facility]: ([phone]  [facility] 24/7 Hotline: (286) 709-4472  [facility]'s National Helpline: 1-729-588-HELP (1770)  Postpartum Support International HelpLine: 1-473.868.4807  The National [place] on Mental Illness, (MELI) Call 1-321.521.1836, or chat, or text \"Friend\" to 36[address], or email helpline@meli.org to connect with us.  Cradle Talk Information  Cradle Talk Online meets virtually every Monday and Friday at 10 a.m. Contact [email or web address] for the current link to attend. Talk-education and social  group for new parents of infant's  to six months old. Virtual Meetings: ,  and  from 10 - 11:00 am. For more information or to attend, email [email or web address]  For new and expectant moms looking for support with the transition to parenthood as well as those experiencing symptoms of  anxiety and/or depression. Virtual meetings: Meets twice a month on  via Medivo. In-person meetings: Meets once a month immediately following Cradle Talk ([facility]) and once a month immediately following Mom & Baby Hour ([facility]). For more information or to attend, email [email or web address]  Depression During and After Pregnancy  If you've experienced a  mood disorder before, you have a 50-80 percent chance of experiencing this again. Women who have had major complications during pregnancy are twice as likely to have PMADs compared with those who had a relatively easy pregnancy. There is a 15-25 percent risk of PMADs in women with a prior history of depression or bipolar disorder, which significantly increases when moms discontinue medications during pregnancy.  If you had PMADs after past pregnancies, your doctor may prescribe antidepressant medication after you deliver to reduce the chances of PMADs returning. While  mood and anxiety disorders cannot be prevented, here are some ways to help yourself:  Plan ahead - Find someone who can help with household chores and the baby during your first week home from the hospital; choose  so you get a break on an ongoing basis; and decide ahead of time what you need to have on hand when the baby arrives.  Educate yourself - Take prenatal classes to teach parenting skills, reliance on assistance from support personnel including spouses, family members, friends and neighbors.  Stay on top of symptoms - Screening questionnaires may help detect depression or risks for depression early. Do your best  to answer screening questions honestly so that your behavioral health provider can help you.  Talk about your feelings - Talk about your fears and anxieties before the baby arrives, and continue these discussions after delivery. Meeting with a professional counselor, attending support groups, and having good social support from loved ones can all help to reduce the symptoms of PMADs. Call the Mom's Line at [phone] - This is a phone line dedicated to women or anyone concerned about a woman, who may be experiencing signs or symptoms of  mood and anxiety, postpartum depression and/or psychosis. A specialist can provide recommendations based on symptoms and/or information on PMADs and/or treatment. Messages on this line are checked daily and calls will be returned within 24 hours.  [facility]   Mom's Line: [phone] This service is provided by [facility]. This phone line is dedicated for women (or anyone worried about a woman) who may be experiencing signs or symptoms of postpartum depression.  At [facility], we're dedicated to helping you achieve complete recovery, so we treat the whole person - biologically, psychologically, socially and spiritually. For admission or enrollment in our inpatient or outpatient services, you will need to set up a confidential behavioral health assessment.  Getting started: Behavioral health assessment  Just like with any other illness, the first step toward feeling better is identifying the problem. Our behavioral health assessment is a confidential evaluation of your symptoms so we can better understand your needs. The information we collect helps us create a customized treatment plan for you. The assessment, conducted by one of our licensed counselors, usually takes one to three hours.  The assessment includes:  A one-on-one meeting with a licensed counselor to discuss your concerns; he or she will also gather  your medical and psychiatric history, identification ('s license or state ID) and insurance information  A level of care and program recommendation  We have convenient locations in [facility] and [place]. To make an appointment for an assessment at any of our locations, call our Help Line 24/7 at [phone].  Helpful Websites  www.llli.org  www.International Network for Outcomes Research(INOR).[employer]  [email or web address]

## 2025-02-10 NOTE — DISCHARGE SUMMARY
[facility]  Discharge Summary    Anne Robbins Patient Status:  inpatient    1995 MRN [id number]   Location 219-A Attending [facility]   Hosp Day # 2 PCP Amina Duncan DO     Date of Admission: 2025    Date of Discharge: 02/10/25    Admitting Diagnosis: pregnancy  Pregnancy (HCC)  No prenatal care  Unknown pregnancy   labor  PPROM    Discharge Diagnosis:  s/p  with shoulder dystocia of a 35 wk female.      Hospital Course: The patient is a 29 year old female now  who was admitted on 2025 for labor,unknown pregnancy diagnosis with PPROM. Magnesium sulfate for neuroprotection and one dose of betamethasone.   Pitocin was initiated. Pain management per patient request. The patient progressed to complete. S/p  on 2025 with shoulder dystocia. Please refer to delivery note for further details. PPD#1, the patient was doing well. She was ambulating and voiding without difficulty. She was tolerating a general diet. Pain was well controlled. PPD#2, the patient continued to do well and was meeting post partum expectations. She was discharged to home and instructed to follow up in 6 weeks.     Consultations: neonatology, social service    Procedures:     Complications: shoulder dystocia    Discharge Condition: Stable    Discharge Medications: Current Discharge Medication List       Medication List        CONTINUE taking these medications      propranolol 10 MG Tabs  Commonly known as: Inderal            STOP taking these medications      Cholecalciferol 50 MCG (2000) Tabs     Ferrous Gluconate 324 (37.5 Fe) MG Tabs                Follow up Visits: Follow-up with [facility] in 6 weeks              Note to patient and family   The  [place] Cures Act makes medical notes available to patients in the interest of transparency.  However, please be advised that this is a medical document.  It is intended as nqmt-[id number] communication.  It is written and medical  language may contain abbreviations or verbiage that are technical and unfamiliar.  It may appear blunt or direct.  Medical documents are intended to carry relevant information, facts as evident, and the clinical opinion of the practitioner.

## 2025-02-10 NOTE — PROGRESS NOTES
Patient up and about, AOx4, VSS, seen by Anesthesia for f/u back pain from Epidural, and instructions given, seen by OB, ok to go home, postpartum care reviewed and completed, seen also by Psych and SW for discharge planning and instructions already given and completed, patient and significant other verbalized understanding of instructions

## 2025-02-10 NOTE — LACTATION NOTE
LACTATION NOTE - MOTHER      Evaluation Type: Inpatient    Problems identified  Problems identified: Knowledge deficit;Milk supply not WNL;Unable to acheive sustained latch (baby in NICU -  35+ 1 weeks - but per SALLY appears term.)  Milk supply not WNL: Reduced (potential);Delayed lactogenesis II  Problems Identified Other: Baby in NICU - mom had no PNC - mom statess he was unaware that she was pregnant - 35+1 weeks but per neonatologist baby appears term.    Maternal history  Maternal history: Anemia;Anxiety;Hypothyroid  Other/comment: No prenatal care - prediabetes, & shoulder dystocia.    Breastfeeding goal  Breastfeeding goal: To maintain breast milk feeding per patient goal    Maternal Assessment  Bilateral Breasts: Pendulous;Elastic  Bilateral Nipples: Colostrum easily expressed;Everted;Pink (measured 22 and 23 mm, using a 25 mm flange -)  Prior breastfeeding experience (comment below): Primip  Prior BF experience: comment: No prior breastfeeding education  Breastfeeding Assistance: Breast exam provided with permission;Hand expression provided with permission;Pumping assistance provided with permission    Pain assessment  Location/Comment: berkley pain with pumping    Guidelines for use of:  Breast pump type: Ameda Platinum;Other (Does not have a breast pump for home use- information on how to pursue a pump through her insurance provided -)  Current use of pump:: States has pumped several times since initiation of pumping - states it is a little easier to pump one sie at a time due to larger pendulous breasts  Suggested use of pump: Pump 8-12X/24hr;Pump if infant is not latching to breast;For comfort as needed  Reported pumping volumes (ml): 0  Other (comment): Anne has pumped several times since intiated yesterda, states she has sometimes pumped just one side at atime and that it is easier to do that due to having larger, pendulous breasts -  Encouraged to do what works best, keep pumping consistently - flange  size assesed. Anne does not have ap ump for home use, information on how to obtain a breast pump provided to go through her insurance. Offered a pump rental for the meantime, initially opted to do that, later a family member states they prefer to purchase a pump vs. renting a pump. States she is aware that it is important to be pumping daily, 8 times and using hand expression. Encouraged to reach out to lactation as needed, assured onging support is available. Has breast milk collection supopies. Reviewed best practices for pumping for a NICU baby.

## 2025-02-10 NOTE — BH PROGRESS NOTE
LEXI PPD SCREENING    Reason for Referral: EPDS = 11    Current Stressors:    History of PPD: Denies   Financial: Denies   Other: Pt reports she was not aware she was pregnant and came into the hospital for \"abdominal pain.\" Pt's family member states Pt was under the care of a PCP who was treating Pt and never gave a pregnancy test so pt did not participate in pre- care. Pt's baby is currently in the NICU \"fighting for her life.\"    Mental Health Status:    Current Symptoms: Sadness, anticipating possible grief   Appearance/General Behavior: Well groomed and cooperative   General Cognitive Functioning: Intact   Thought Process: Linear   Thought Content: Sadness, worry, fearfulness   Mood/Affect: Sad, affect congruent. At times pt has nervous laughter and smiles due to the unknown of her baby's health.   Orientation: Aox4   Speech: Normal volume, rhythm, and tone   Homicidal/Suicidal Ideation/Plan: Denies HI/SI    Living Arrangement:    Who Resides at Home: Pt lives with her family   Has other Children: Denies   Is Father of the Baby involved: No   Has Familial Support: Yes   Has Other Support Network: Has been given anxiety and depression medications, does not see a therapist.    Plan:    POST-PARTUM DEPRESSION SCREENING FOLLOW-UP RESOURCES  Mom's Line at [facility]: ([phone]  [facility]  Hotline: (503) 122-6821  [facility] National Helpline: 5-645-170-HELP (5993)  Postpartum Support International HelpLine: 1-536.235.5901  The National [place] on Mental Illness, (MELI) Call 1-989.436.1221, or chat, or text \"Friend\" to 46[address], or email helpline@[employer].org to connect with us.  Cradle Talk Information  Cradle Talk Online meets virtually every Monday and Friday at 10 a.m. Contact [email or web address] for the current link to attend. Talk-education and social group for new parents of infant's  to six months old. Virtual Meetings: ,  and  from 10 - 11:00 am.  For more information or to attend, email [email or web address]  For new and expectant moms looking for support with the transition to parenthood as well as those experiencing symptoms of  anxiety and/or depression. Virtual meetings: Meets twice a month on  via Exiles. In-person meetings: Meets once a month immediately following Cradle Talk ([facility]) and once a month immediately following Mom & Baby Hour ([facility]). For more information or to attend, email [email or web address]  Depression During and After Pregnancy  If you've experienced a  mood disorder before, you have a 50-80 percent chance of experiencing this again. Women who have had major complications during pregnancy are twice as likely to have PMADs compared with those who had a relatively easy pregnancy. There is a 15-25 percent risk of PMADs in women with a prior history of depression or bipolar disorder, which significantly increases when moms discontinue medications during pregnancy.  If you had PMADs after past pregnancies, your doctor may prescribe antidepressant medication after you deliver to reduce the chances of PMADs returning. While  mood and anxiety disorders cannot be prevented, here are some ways to help yourself:  Plan ahead - Find someone who can help with household chores and the baby during your first week home from the hospital; choose  so you get a break on an ongoing basis; and decide ahead of time what you need to have on hand when the baby arrives.  Educate yourself - Take prenatal classes to teach parenting skills, reliance on assistance from support personnel including spouses, family members, friends and neighbors.  Stay on top of symptoms - Screening questionnaires may help detect depression or risks for depression early. Do your best to answer screening questions honestly so that your behavioral health provider can help you.  Talk about your feelings - Talk  about your fears and anxieties before the baby arrives, and continue these discussions after delivery. Meeting with a professional counselor, attending support groups, and having good social support from loved ones can all help to reduce the symptoms of PMADs. Call the Mom's Line at [phone] - This is a phone line dedicated to women or anyone concerned about a woman, who may be experiencing signs or symptoms of  mood and anxiety, postpartum depression and/or psychosis. A specialist can provide recommendations based on symptoms and/or information on PMADs and/or treatment. Messages on this line are checked daily and calls will be returned within 24 hours.  [facility]   Mom's Line: [phone] This service is provided by [facility]. This phone line is dedicated for women (or anyone worried about a woman) who may be experiencing signs or symptoms of postpartum depression.  At [facility], we're dedicated to helping you achieve complete recovery, so we treat the whole person - biologically, psychologically, socially and spiritually. For admission or enrollment in our inpatient or outpatient services, you will need to set up a confidential behavioral health assessment.  Getting started: Behavioral health assessment  Just like with any other illness, the first step toward feeling better is identifying the problem. Our behavioral health assessment is a confidential evaluation of your symptoms so we can better understand your needs. The information we collect helps us create a customized treatment plan for you. The assessment, conducted by one of our licensed counselors, usually takes one to three hours.  The assessment includes:  A one-on-one meeting with a licensed counselor to discuss your concerns; he or she will also gather your medical and psychiatric history, identification ('s license or state ID) and insurance information  A level of care  and program recommendation  We have convenient locations in [facility], [place], [facility] and [place]. To make an appointment for an assessment at any of our locations, call our Help Line 24/7 at [phone].  Helpful Websites  www.llli.org  www.[employer]  [email or web address]

## 2025-02-10 NOTE — PROGRESS NOTES
Patient up and about, going home with significant other with their belongings, patienty will see her baby in NICU then go home from there

## 2025-02-12 ENCOUNTER — TELEPHONE (OUTPATIENT)
Dept: OBGYN UNIT | Facility: HOSPITAL | Age: 30
End: 2025-02-12

## 2025-02-12 ENCOUNTER — PATIENT OUTREACH (OUTPATIENT)
Dept: CASE MANAGEMENT | Age: 30
End: 2025-02-12

## 2025-02-12 NOTE — PROGRESS NOTES
Called pt to schedule a 6wk post-partum appt :      (Discharged [facility])        Delivering Clinician: SERAFIN ARGUETA Delivery Date:2/8/2025 Delivery Type: Normal spontaneous vaginal delivery     (Pt had issues accessing her Mychart.  Per the pt's request we kept her username the same but I then updated her password.  The pt will log into her mychart and update her password once she's home    OBGYN Request :    Follow up with [facility] - OB/GYN in 6 days Specialty: Obstetrics/Gynecology [facility] [place]  [address] [address] [phone]  Friday 3/21 @9:20am w/ Dr. Serafin Argueta      Spoke with pt to confirm a scheduled 6 week post-partum appt.  Pt verbalized that she understands & no further assistance needed      Closing encounter

## 2025-02-12 NOTE — PROGRESS NOTES
Reviewed self and infant care w / mom, she verbalizes understanding of instructions reviewed. Encourage to follow up w/ MDs as directed and w/ questions/concerns. Pt did not know she was pg, came into ER for abd pain. Hx of depression, lives w her mom, FOB not involved, has questions re br pump, supplies carseat for baby, is unemployed and w no source of income, enc to ask for NICU SW for documentation of babys birth, to start plans for baby coming home. E=11, care reviewed and enc to join ct, all flyers sent to her my chart acct w her ok

## 2025-02-17 ENCOUNTER — TELEPHONE (OUTPATIENT)
Dept: OBGYN CLINIC | Facility: CLINIC | Age: 30
End: 2025-02-17

## 2025-02-17 NOTE — TELEPHONE ENCOUNTER
Patient has never been seen in our office.  Patient was delivered by Dr. Kim on 2/8/2025 and is scheduled for her 6 week postpartum visit with Dr. Kim.  Spoke to patient.  Informed patient that no ER follow-up is needed in our office.  Advised patient to contact the office of Dr. Kim with any questions or concerns prior to her 6 week visit scheduled on 3/21/2025.  Patient verbalized understanding.

## 2025-02-17 NOTE — TELEPHONE ENCOUNTER
Patient is following up regarding ED visit on 02/08/25.  The patient states that she was advised to contact our office for a follow up visit from this encounter.  The patient also has a 6wkk PP scheduled on 03/21 with Dr. Kim.   Please advise pt if ED follow up should be with our office and if applicable, assist with scheduling.    Thank you.

## 2025-03-21 ENCOUNTER — POSTPARTUM (OUTPATIENT)
Facility: CLINIC | Age: 30
End: 2025-03-21
Payer: MEDICAID

## 2025-03-21 VITALS
SYSTOLIC BLOOD PRESSURE: 116 MMHG | BODY MASS INDEX: 51.91 KG/M2 | WEIGHT: 293 LBS | HEART RATE: 87 BPM | DIASTOLIC BLOOD PRESSURE: 74 MMHG | HEIGHT: 63 IN

## 2025-03-21 RX ORDER — CHOLECALCIFEROL (VITAMIN D3) 25 MCG
1 TABLET,CHEWABLE ORAL DAILY
COMMUNITY

## 2025-03-21 RX ORDER — ACETAMINOPHEN 160 MG
5000 TABLET,DISINTEGRATING ORAL DAILY
COMMUNITY

## 2025-03-21 RX ORDER — NORETHINDRONE ACETATE AND ETHINYL ESTRADIOL 1MG-20(21)
1 KIT ORAL DAILY
Qty: 84 TABLET | Refills: 2 | Status: SHIPPED | OUTPATIENT
Start: 2025-03-21 | End: 2026-03-21

## 2025-03-21 NOTE — PROGRESS NOTES
HPI    Anne Robbins is a 29 year old female  here for 6 week post-partum visit.  Patient delivered a  female infant on 25 via .  Patient desires OCP for contraception.  Patient is breast & bottle feeding.   Patient denies symptoms of depression, [place]  depression scale score of 17 out of 30. Referred to psych eval     [place]  DEPRESSION SCALE  I have been able to laugh and see the funny side of things: As much as I always could  I have looked forward with enjoyment to things: Definitely less than I used to  I have been anxious or worried for no good reason: Yes, very often  I have felt scared or panicky for no good reason: Yes, sometimes  Things have been getting on top of me: Yes, sometimes I haven't been coping as well as usual  I have been so unhappy that I have had difficulty sleeping: Not very often  I have felt sad or miserable: Yes, quite often  I have been so unhappy that I have been crying: Yes, quite often  The thought of harming myself has occurred to me: Hardly ever  Total: 17    OBSTETRIC HISTORY  OB History    Para Term  AB Living   1 1 0 1 0 1   SAB IAB Ectopic Multiple Live Births   0 0 0 0 1      # Outcome Date GA Lbr [place]/2nd Weight Sex Type Anes PTL Lv   1  25 35w1d 06:20 / 01:07 8 lb 6.4 oz (3.81 kg) F NORMAL SPONT EPI, Local N SUMI      Complications: No prenatal care, Other - see comments       GYNE HISTORY        MEDICATIONS:    Current Outpatient Medications:     cholecalciferol 50 MCG (2000 UT) Oral Cap, Take 5,000 Units by mouth daily., Disp: , Rfl:     prenatal vitamin with DHA 27-0.8-228 MG Oral Cap, Take 1 capsule by mouth daily., Disp: , Rfl:     propranolol 10 MG Oral Tab, Take 1 tablet (10 mg total) by mouth 3 (three) times daily as needed. (Patient not taking: Reported on 3/21/2025), Disp: , Rfl:     ALLERGIES:  Allergies[1]    PHYSICAL EXAM  Blood pressure 116/74, pulse 87, height 63\", weight (!) 357 lb 6.4 oz  (162.1 kg), last menstrual period 07/22/2024, currently breastfeeding.  General:  Well nourished, well developed woman in no acute distress  Abdomen:  soft, nontender, no masses  External Genitalia: normal appearance, hair distribution, and no lesions  Vagina:  Normal appearance without lesions, no abnormal discharge  Cervix:  Normal without tenderness on motion  Uterus: normal in size, contour, position, mobility, without tenderness  Adnexa: normal without masses or tenderness  Perineum: well healed perineum  Anus: no hemorroids       ASSESSMENT/PLAN  Anne was seen today for postpartum care and other.    Diagnoses and all orders for this visit:    Postpartum exam (HCC)      Discussed all options of birthcontrol including ocps, minipill, Mirena or Paragard IUD, nuvaring, orthoevra patch, nexplanon, Depoprovera, condoms or tubal sterilization options. Patient has chosen OCP.  Patient to return for annual gyne exam in November.       [1]   Allergies  Allergen Reactions    Shellfish-Derived Products Tightness in Throat    Shrimp ANAPHYLAXIS     Can eat crab and lobster

## 2025-04-14 ENCOUNTER — TELEPHONE (OUTPATIENT)
Facility: CLINIC | Age: 30
End: 2025-04-14

## 2025-04-14 NOTE — TELEPHONE ENCOUNTER
Breast Pump order was received from [employer].  Order form was placed in Dr. Nette Kim's bin for signature.

## 2025-04-19 ENCOUNTER — TELEPHONE (OUTPATIENT)
Facility: CLINIC | Age: 30
End: 2025-04-19

## 2025-04-19 NOTE — TELEPHONE ENCOUNTER
Form from [facility] for diaper program received, placed in EP bin to review/sign.

## (undated) DIAGNOSIS — Z11.59 SCREENING FOR VIRAL DISEASE: ICD-10-CM

## (undated) DIAGNOSIS — Z01.818 PREOP EXAMINATION: Primary | ICD-10-CM

## (undated) NOTE — ED AVS SNAPSHOT
[facility] Emergency Department  [facility]  [address] [address]  Phone:  [phone]  Fax:  [phone]   MRN: [[id number]]    Department:  [facility] Emergency Department   Date of Visit:  7/9/2017 IF THERE IS ANY CHANGE OR WORSENING OF YOUR CONDITION, CALL YOUR PRIMARY CARE PHYSICIAN AT ONCE OR RETURN IMMEDIATELY TO THE EMERGENCY DEPARTMENT.     If you have been prescribed any medication(s), please fill your prescription right away and begin taking t

## (undated) NOTE — ED AVS SNAPSHOT
Norm Earing   MRN: [[id number]]    Department:  [facility] Emergency Department   Date of Visit:  7/7/2019           Disclosure     Insurance plans vary and the physician(s) referred by the ER may not be covered by your plan.  Please contact tell this physician (or your personal doctor if your instructions are to return to your personal doctor) about any new or lasting problems. The primary care or specialist physician will see patients referred from the [facility] Emergency Department.  Trey Enamorado

## (undated) NOTE — ED AVS SNAPSHOT
Aditi Wesley   MRN: [[id number]]    Department:  [facility] Emergency Department   Date of Visit:  7/22/2019           Disclosure     Insurance plans vary and the physician(s) referred by the ER may not be covered by your plan.  Please contac tell this physician (or your personal doctor if your instructions are to return to your personal doctor) about any new or lasting problems. The primary care or specialist physician will see patients referred from the [facility] Emergency Department.  Caridad Alcantara